# Patient Record
Sex: FEMALE | Race: WHITE | HISPANIC OR LATINO | Employment: OTHER | URBAN - METROPOLITAN AREA
[De-identification: names, ages, dates, MRNs, and addresses within clinical notes are randomized per-mention and may not be internally consistent; named-entity substitution may affect disease eponyms.]

---

## 2018-07-23 ENCOUNTER — HOME HEALTH ADMISSION (OUTPATIENT)
Dept: HOME HEALTH SERVICES | Facility: HOME HEALTHCARE | Age: 78
End: 2018-07-23
Payer: MEDICARE

## 2018-08-09 ENCOUNTER — APPOINTMENT (OUTPATIENT)
Dept: ADMISSIONS | Facility: MEDICAL CENTER | Age: 78
End: 2018-08-09
Attending: OPHTHALMOLOGY
Payer: MEDICARE

## 2018-08-09 DIAGNOSIS — Z01.810 PRE-OPERATIVE CARDIOVASCULAR EXAMINATION: ICD-10-CM

## 2018-08-09 RX ORDER — ALENDRONATE SODIUM 70 MG/1
70 TABLET ORAL
Status: ON HOLD | COMMUNITY
End: 2023-02-27

## 2018-08-09 RX ORDER — LOSARTAN POTASSIUM AND HYDROCHLOROTHIAZIDE 12.5; 5 MG/1; MG/1
1 TABLET ORAL DAILY
Status: ON HOLD | COMMUNITY
End: 2023-02-27

## 2018-08-10 LAB — EKG IMPRESSION: NORMAL

## 2018-08-13 ENCOUNTER — HOSPITAL ENCOUNTER (OUTPATIENT)
Facility: MEDICAL CENTER | Age: 78
End: 2018-08-13
Attending: OPHTHALMOLOGY | Admitting: OPHTHALMOLOGY

## 2018-08-13 VITALS
HEIGHT: 59 IN | RESPIRATION RATE: 16 BRPM | HEART RATE: 63 BPM | TEMPERATURE: 98.2 F | SYSTOLIC BLOOD PRESSURE: 137 MMHG | DIASTOLIC BLOOD PRESSURE: 82 MMHG | OXYGEN SATURATION: 98 % | BODY MASS INDEX: 30.4 KG/M2 | WEIGHT: 150.79 LBS

## 2018-08-13 PROCEDURE — 502240 HCHG MISC OR SUPPLY RC 0272: Performed by: OPHTHALMOLOGY

## 2018-08-13 PROCEDURE — 500855 HCHG NEEDLE, IRRIG CYSTOTOME 27G: Performed by: OPHTHALMOLOGY

## 2018-08-13 PROCEDURE — 99152 MOD SED SAME PHYS/QHP 5/>YRS: CPT | Performed by: OPHTHALMOLOGY

## 2018-08-13 PROCEDURE — 501836 HCHG SUTURE EYE: Performed by: OPHTHALMOLOGY

## 2018-08-13 PROCEDURE — 160048 HCHG OR STATISTICAL LEVEL 1-5: Performed by: OPHTHALMOLOGY

## 2018-08-13 PROCEDURE — 500792 HCHG KNIFE, SLIT 2.75 DUAL BEVEL ANGL: Performed by: OPHTHALMOLOGY

## 2018-08-13 PROCEDURE — 160029 HCHG SURGERY MINUTES - 1ST 30 MINS LEVEL 4: Performed by: OPHTHALMOLOGY

## 2018-08-13 PROCEDURE — 700101 HCHG RX REV CODE 250

## 2018-08-13 PROCEDURE — 160002 HCHG RECOVERY MINUTES (STAT): Performed by: OPHTHALMOLOGY

## 2018-08-13 PROCEDURE — 99153 MOD SED SAME PHYS/QHP EA: CPT | Performed by: OPHTHALMOLOGY

## 2018-08-13 PROCEDURE — 160035 HCHG PACU - 1ST 60 MINS PHASE I: Performed by: OPHTHALMOLOGY

## 2018-08-13 PROCEDURE — 700111 HCHG RX REV CODE 636 W/ 250 OVERRIDE (IP)

## 2018-08-13 PROCEDURE — 501749 HCHG SHELL REV 276: Performed by: OPHTHALMOLOGY

## 2018-08-13 PROCEDURE — 160041 HCHG SURGERY MINUTES - EA ADDL 1 MIN LEVEL 4: Performed by: OPHTHALMOLOGY

## 2018-08-13 DEVICE — IMPLANTABLE DEVICE: Type: IMPLANTABLE DEVICE | Site: EYE | Status: FUNCTIONAL

## 2018-08-13 RX ORDER — PROPARACAINE HYDROCHLORIDE 5 MG/ML
SOLUTION/ DROPS OPHTHALMIC
Status: COMPLETED
Start: 2018-08-13 | End: 2018-08-13

## 2018-08-13 RX ORDER — MOXIFLOXACIN 5 MG/ML
SOLUTION/ DROPS OPHTHALMIC
Status: DISCONTINUED | OUTPATIENT
Start: 2018-08-13 | End: 2018-08-13 | Stop reason: HOSPADM

## 2018-08-13 RX ORDER — SODIUM CHLORIDE, SODIUM LACTATE, POTASSIUM CHLORIDE, CALCIUM CHLORIDE 600; 310; 30; 20 MG/100ML; MG/100ML; MG/100ML; MG/100ML
INJECTION, SOLUTION INTRAVENOUS CONTINUOUS
Status: DISCONTINUED | OUTPATIENT
Start: 2018-08-13 | End: 2018-08-13 | Stop reason: HOSPADM

## 2018-08-13 RX ORDER — LIDOCAINE HYDROCHLORIDE 20 MG/ML
INJECTION, SOLUTION EPIDURAL; INFILTRATION; INTRACAUDAL; PERINEURAL
Status: DISCONTINUED | OUTPATIENT
Start: 2018-08-13 | End: 2018-08-13 | Stop reason: HOSPADM

## 2018-08-13 RX ORDER — PROPARACAINE HYDROCHLORIDE 5 MG/ML
SOLUTION/ DROPS OPHTHALMIC
Status: DISCONTINUED | OUTPATIENT
Start: 2018-08-13 | End: 2018-08-13 | Stop reason: HOSPADM

## 2018-08-13 RX ORDER — MOXIFLOXACIN 5 MG/ML
SOLUTION/ DROPS OPHTHALMIC
Status: DISCONTINUED
Start: 2018-08-13 | End: 2018-08-13 | Stop reason: HOSPADM

## 2018-08-13 RX ORDER — TROPICAMIDE 10 MG/ML
SOLUTION/ DROPS OPHTHALMIC
Status: COMPLETED
Start: 2018-08-13 | End: 2018-08-13

## 2018-08-13 RX ORDER — PHENYLEPHRINE HYDROCHLORIDE 25 MG/ML
SOLUTION/ DROPS OPHTHALMIC
Status: COMPLETED
Start: 2018-08-13 | End: 2018-08-13

## 2018-08-13 RX ADMIN — PROPARACAINE HYDROCHLORIDE 1 DROP: 5 SOLUTION/ DROPS OPHTHALMIC at 06:45

## 2018-08-13 RX ADMIN — SODIUM CHLORIDE, SODIUM LACTATE, POTASSIUM CHLORIDE, CALCIUM CHLORIDE 1000 ML: 600; 310; 30; 20 INJECTION, SOLUTION INTRAVENOUS at 07:00

## 2018-08-13 RX ADMIN — TROPICAMIDE 1 DROP: 10 SOLUTION/ DROPS OPHTHALMIC at 06:45

## 2018-08-13 RX ADMIN — PHENYLEPHRINE HYDROCHLORIDE 1 DROP: 2.5 SOLUTION/ DROPS OPHTHALMIC at 06:45

## 2018-08-13 ASSESSMENT — PAIN SCALES - GENERAL: PAINLEVEL_OUTOF10: 0

## 2018-08-13 NOTE — OP REPORT
DATE OF SERVICE:  08/13/2018    PROCEDURE:  Phaco IOL, left eye.    PREOPERATIVE DIAGNOSIS:  Cataract, left eye.    POSTOPERATIVE DIAGNOSIS:  Cataract, left eye.    SURGEON:  Edwardo Saeed MD    ASSISTANT:  None.    COMPLICATIONS:  None.    ESTIMATED BLOOD LOSS:  None.    ANESTHESIA:  Topical with MAC.    ANESTHESIOLOGIST:  Harley Campbell MD    Please note Trypan blue was used for enhanced necessary visualization of the   anterior capsule.    PROCEDURE IN DETAIL:  After appropriate consents were obtained, the patient   was brought to the operating room and then prepared and draped in the usual   sterile fashion for ophthalmology.  Lid speculum was placed in the left eye   after which a supersharp was used to make a stab incision at the 4 o'clock   position through which 2% preservative-free Xylocaine and Viscoat was injected   followed by a 2.75 mm stab incision at the 2 o'clock position through which   cystotomsalvador and Utrata made a 360 degree capsulorrhexis followed by   hydrodissection and spinning of the nucleus with BSS and a blunt cannula.   Phaco removed the lens.  I and A of the cortex. An SN60WF 19.5 was placed into   the posterior capsule with Provisc.  All the viscoelastics were removed with   I and A.  Lens was centered.  Lid speculum was removed under the microscope   after the wounds had been hydrated with BSS on a blunt cannula noted to be   free of leak and a lid speculum in the anterior chamber remained soft and well   formed.     LENS TYPE:  ZCB00 22.0.       ____________________________________     Edwardo Saeed MD    [facility] / [facility]    DD:  08/13/2018 11:37:06  DT:  08/13/2018 11:44:12    D#:  [id number]  Job#:  [id number]

## 2018-08-13 NOTE — DISCHARGE INSTRUCTIONS
HOME CARE INSTRUCTIONS FOR CATARACT SURGERY    ACTIVITY: Rest and take it easy for the first 24 hours. We strongly suggest that a responsible adult remain with you during that time. It is normal to feel sleepy. We encourage you to not do anything that requires balance, judgment or coordination. Be extra careful when walking (with a dilated eye, it is easier to trip and fall).     FOR 24 HOURS, DO NOT:       Drive, operate machinery or run household appliances.        Drink beer or alcoholic beverages.        Make important decisions or sign legal documents.     DIET: To avoid nausea, slowly advance diet as tolerated, avoiding spicy or greasy foods for the first meal.     MEDICATIONS: Resume taking daily medication. You may take Tylenol for mild discomfort, if needed.     SURGICAL DRESSING: Eye shield as instructed by your doctor. Dark glasses should be worn while in the sunlight.     Follow your Physician's instruction Sheet. Eye Kit Given    A follow-up appointment is scheduled with your doctor tomorrow at _______ am/pm.     You should call 911 if you develop problems with breathing or chest pain.  You should CALL YOUR PHYSICIAN if you develop: Sharp stabbing pain or sudden change in vision in your operative eye. If you are unable to contact your doctor or the surgical center, you should go to the nearest emergency room or urgent care center.   Physician's telephone # __Dr Saeed [phone]________________________    If any questions arise, call your doctor. If your doctor is not available, please feel free to call Same Day Surgery at [phone]. You can also call the [facility] Hotline open 24 hours/day, 7 days/week and speak to a nurse at [phone] or [phone].     I acknowledge receipt and understanding of these Home Care Instructions.    ______________________________     _______________________________            Signature of Patient / Responsible Adult                                                        RN Signature    A registered nurse may call you a few days after your surgery to see how you are doing.   You may also receive a survey in the mail within the next two weeks and we ask that you take a few moments to complete and return the survey. Our goal is to provide you with very good care and we value your comments. Thank you for choosing [facility].

## 2018-08-13 NOTE — OR NURSING
1029 Pt transferred to PACU. Report received from OR RN and anesthesia. Pt is awake and alert. VS stable, respirations even and unlabored. No bleeding, L eye is dilated. No complaints of pain.     1043 Pt and daughter educated on discharge instructions. Verbalized understanding. All questions answered. All belongings are with patient.

## 2019-02-20 ENCOUNTER — HOSPITAL ENCOUNTER (OUTPATIENT)
Dept: CARDIOLOGY | Facility: MEDICAL CENTER | Age: 79
End: 2019-02-20
Attending: SURGERY
Payer: MEDICARE

## 2019-02-20 LAB — EKG IMPRESSION: NORMAL

## 2019-02-20 PROCEDURE — 93010 ELECTROCARDIOGRAM REPORT: CPT | Performed by: INTERNAL MEDICINE

## 2019-02-20 PROCEDURE — 93005 ELECTROCARDIOGRAM TRACING: CPT | Performed by: SURGERY

## 2019-06-14 ENCOUNTER — OFFICE VISIT (OUTPATIENT)
Dept: CARDIOLOGY | Facility: MEDICAL CENTER | Age: 79
End: 2019-06-14
Payer: MEDICARE

## 2019-06-14 VITALS
HEART RATE: 76 BPM | SYSTOLIC BLOOD PRESSURE: 118 MMHG | DIASTOLIC BLOOD PRESSURE: 80 MMHG | OXYGEN SATURATION: 95 % | BODY MASS INDEX: 30.33 KG/M2 | HEIGHT: 59 IN | WEIGHT: 150.46 LBS

## 2019-06-14 DIAGNOSIS — Z01.810 PREOPERATIVE CARDIOVASCULAR EXAMINATION: ICD-10-CM

## 2019-06-14 DIAGNOSIS — I10 ESSENTIAL HYPERTENSION, BENIGN: ICD-10-CM

## 2019-06-14 PROBLEM — Q89.2 THYROGLOSSAL DUCT CYST: Status: ACTIVE | Noted: 2019-05-30

## 2019-06-14 PROBLEM — R41.3 MEMORY LOSS: Status: ACTIVE | Noted: 2019-05-30

## 2019-06-14 PROBLEM — N18.9 CHRONIC RENAL FAILURE: Status: ACTIVE | Noted: 2019-05-30

## 2019-06-14 PROBLEM — K76.89 LIVER CYST: Status: ACTIVE | Noted: 2019-05-30

## 2019-06-14 PROCEDURE — 99204 OFFICE O/P NEW MOD 45 MIN: CPT | Performed by: INTERNAL MEDICINE

## 2019-06-14 RX ORDER — LOSARTAN POTASSIUM 50 MG/1
TABLET ORAL
Refills: 1 | Status: ON HOLD | COMMUNITY
Start: 2019-05-16 | End: 2023-02-27

## 2019-06-14 NOTE — PROGRESS NOTES
Cardiology Initial Consultation Note    Date of note:    6/14/2019    Primary Care Provider: Pcp Pt States None  Referring Provider: Suzan Fam M.D.     Patient Name: Janine Lemon     YOB: 1940  MRN:              [id number]    Chief Complaint: pre-op cardiovascular exam    Janine Torrez is a 79 y.o. female  patient presented today for preop cardiovascular examination prior to thyroglossal cyst removal surgery.  She has dementia, her daughter helps her with food and medications but she takes care of herself otherwise.  She can walk 15 minutes at a slow pace.  Denies chest pain or shortness of breath with exertion.  No prior cardiovascular history.  She has hypertension which has been under control with medications.    ROS    Positive for weight loss, headache, hearing loss, memory loss  All other systems reviewed and discussed using a comprehensive questionnaire and are negative.     Past medical history, family history, social history, allergies and labs are reviewed and updated as needed as documented below.    Past Medical History:   Diagnosis Date   • Hypertension          Past Surgical History:   Procedure Laterality Date   • CATARACT PHACO WITH IOL Left 8/13/2018    Procedure: CATARACT PHACO WITH IOL;  Surgeon: Edwardo Saeed M.D.;  Location: SURGERY SAME DAY [facility];  Service: Ophthalmology         Current Outpatient Prescriptions   Medication Sig Dispense Refill   • losartan-hydrochlorothiazide (HYZAAR) 50-12.5 MG per tablet Take 1 Tab by mouth every day.     • losartan (COZAAR) 50 MG Tab TAKE 1 TABLET BY MOUTH DAILY  1   • alendronate (FOSAMAX) 70 MG Tab Take 70 mg by mouth every 7 days.       No current facility-administered medications for this visit.          No Known Allergies      No family history of coronary artery disease    Social History     Social History   • Marital status: Single     Spouse name: N/A   • Number of children: N/A   • Years of  "education: N/A     Occupational History   • Not on file.     Social History Main Topics   • Smoking status: Never Smoker   • Smokeless tobacco: Never Used   • Alcohol use No   • Drug use: No   • Sexual activity: Not on file     Other Topics Concern   • Not on file     Social History Narrative   • No narrative on file         Physical Exam:  Ambulatory Vitals  /80 (BP Location: Right arm, Patient Position: Sitting, BP Cuff Size: Adult)   Pulse 76   Ht 1.499 m (4' 11\")   Wt 68.2 kg (150 lb 7.4 oz)   SpO2 95%    Oxygen Therapy:  Pulse Oximetry: 95 %  BP Readings from Last 4 Encounters:   19 118/80   18 137/82   16 152/86       Weight/BMI: Body mass index is 30.39 kg/m².  Wt Readings from Last 4 Encounters:   19 68.2 kg (150 lb 7.4 oz)   18 68.4 kg (150 lb 12.7 oz)   16 73.9 kg (163 lb)       General: Well appearing and in no apparent distress  Head: atrumatic  Eyes: No conjunctival pallor   ENT: normal external appearance of nose and ears  Neck: JVD absent, carotid bruits absent  Lungs: respiratory sounds  normal, additional breath sounds absent  Heart: Regular rhythm,   No palpable thrills on palpation, murmurs absent, no rubs,   Lower extremity edema absent.   Pedal pulses normal  Abdomen: soft, non tender, non distended.  Extremities/MSK: no clubbing, no cyanosis  Neurological: normal orientation, Gait normal   Psychiatric: Appropriate affect, intact judgement and insight  Skin: Warm extremities      Cardiac Imaging and Procedures Review:    EKG dated 2019 : My personal interpretation is Sinus rhythm. Inferior Q waves.      Medical Decision Makin-year-old female patient with history of hypertension here for preop cardiovascular examination prior to thyroglossal cyst removal surgery.  -Her functional capacity is at least 4 METS.  No symptoms suggestive of coronary artery disease.  -Examination is benign, no murmurs, no evidence of heart failure.  -However her " EKG from February shows inferior Q waves with possible inferior MI, I will get an echocardiogram to evaluate her LV function, if normal she can proceed with surgery.  Blood pressure well controlled, continue current management with losartan and hydrochlorothiazide.    Follow-up as needed  This note was dictated using Dragon speech recognition software.    Guilherme ZARAGOZA  Interventional cardiologist  [facility]  [address] [address]  Phone: [phone]  Fax: [phone]

## 2021-01-14 DIAGNOSIS — Z23 NEED FOR VACCINATION: ICD-10-CM

## 2021-08-13 ENCOUNTER — HOSPITAL ENCOUNTER (OUTPATIENT)
Dept: LAB | Facility: MEDICAL CENTER | Age: 81
End: 2021-08-13
Attending: HOSPITALIST
Payer: MEDICARE

## 2021-08-13 LAB
ALBUMIN SERPL BCP-MCNC: 4.2 G/DL (ref 3.2–4.9)
ALBUMIN/GLOB SERPL: 1.5 G/DL
ALP SERPL-CCNC: 227 U/L (ref 30–99)
ALT SERPL-CCNC: 34 U/L (ref 2–50)
ANION GAP SERPL CALC-SCNC: 12 MMOL/L (ref 7–16)
AST SERPL-CCNC: 34 U/L (ref 12–45)
BILIRUB SERPL-MCNC: 0.7 MG/DL (ref 0.1–1.5)
BUN SERPL-MCNC: 23 MG/DL (ref 8–22)
CALCIUM SERPL-MCNC: 9.5 MG/DL (ref 8.5–10.5)
CHLORIDE SERPL-SCNC: 104 MMOL/L (ref 96–112)
CHOLEST SERPL-MCNC: 98 MG/DL (ref 100–199)
CK SERPL-CCNC: 62 U/L (ref 0–154)
CO2 SERPL-SCNC: 23 MMOL/L (ref 20–33)
CREAT SERPL-MCNC: 1.01 MG/DL (ref 0.5–1.4)
FASTING STATUS PATIENT QL REPORTED: NORMAL
GLOBULIN SER CALC-MCNC: 2.8 G/DL (ref 1.9–3.5)
GLUCOSE SERPL-MCNC: 92 MG/DL (ref 65–99)
HDLC SERPL-MCNC: 51 MG/DL
LDLC SERPL CALC-MCNC: 32 MG/DL
POTASSIUM SERPL-SCNC: 4.2 MMOL/L (ref 3.6–5.5)
PROT SERPL-MCNC: 7 G/DL (ref 6–8.2)
SODIUM SERPL-SCNC: 139 MMOL/L (ref 135–145)
TRIGL SERPL-MCNC: 77 MG/DL (ref 0–149)

## 2021-08-13 PROCEDURE — 82550 ASSAY OF CK (CPK): CPT

## 2021-08-13 PROCEDURE — 80061 LIPID PANEL: CPT

## 2021-08-13 PROCEDURE — 80053 COMPREHEN METABOLIC PANEL: CPT

## 2021-08-13 PROCEDURE — 36415 COLL VENOUS BLD VENIPUNCTURE: CPT

## 2021-11-08 ENCOUNTER — APPOINTMENT (OUTPATIENT)
Dept: RADIOLOGY | Facility: MEDICAL CENTER | Age: 81
End: 2021-11-08
Attending: EMERGENCY MEDICINE
Payer: MEDICARE

## 2021-11-08 ENCOUNTER — HOSPITAL ENCOUNTER (EMERGENCY)
Facility: MEDICAL CENTER | Age: 81
End: 2021-11-09
Attending: EMERGENCY MEDICINE
Payer: MEDICARE

## 2021-11-08 DIAGNOSIS — R42 DIZZINESS: ICD-10-CM

## 2021-11-08 LAB
BASOPHILS # BLD AUTO: 0.5 % (ref 0–1.8)
BASOPHILS # BLD: 0.03 K/UL (ref 0–0.12)
EKG IMPRESSION: NORMAL
EOSINOPHIL # BLD AUTO: 0.12 K/UL (ref 0–0.51)
EOSINOPHIL NFR BLD: 1.9 % (ref 0–6.9)
ERYTHROCYTE [DISTWIDTH] IN BLOOD BY AUTOMATED COUNT: 50.5 FL (ref 35.9–50)
HCT VFR BLD AUTO: 39 % (ref 37–47)
HGB BLD-MCNC: 12.4 G/DL (ref 12–16)
IMM GRANULOCYTES # BLD AUTO: 0.02 K/UL (ref 0–0.11)
IMM GRANULOCYTES NFR BLD AUTO: 0.3 % (ref 0–0.9)
LYMPHOCYTES # BLD AUTO: 1.34 K/UL (ref 1–4.8)
LYMPHOCYTES NFR BLD: 20.8 % (ref 22–41)
MCH RBC QN AUTO: 29.7 PG (ref 27–33)
MCHC RBC AUTO-ENTMCNC: 31.8 G/DL (ref 33.6–35)
MCV RBC AUTO: 93.5 FL (ref 81.4–97.8)
MONOCYTES # BLD AUTO: 0.37 K/UL (ref 0–0.85)
MONOCYTES NFR BLD AUTO: 5.8 % (ref 0–13.4)
NEUTROPHILS # BLD AUTO: 4.55 K/UL (ref 2–7.15)
NEUTROPHILS NFR BLD: 70.7 % (ref 44–72)
NRBC # BLD AUTO: 0 K/UL
NRBC BLD-RTO: 0 /100 WBC
PLATELET # BLD AUTO: 154 K/UL (ref 164–446)
PMV BLD AUTO: 9.1 FL (ref 9–12.9)
RBC # BLD AUTO: 4.17 M/UL (ref 4.2–5.4)
WBC # BLD AUTO: 6.4 K/UL (ref 4.8–10.8)

## 2021-11-08 PROCEDURE — 99284 EMERGENCY DEPT VISIT MOD MDM: CPT

## 2021-11-08 PROCEDURE — 80053 COMPREHEN METABOLIC PANEL: CPT

## 2021-11-08 PROCEDURE — 36415 COLL VENOUS BLD VENIPUNCTURE: CPT

## 2021-11-08 PROCEDURE — 93005 ELECTROCARDIOGRAM TRACING: CPT | Performed by: EMERGENCY MEDICINE

## 2021-11-08 PROCEDURE — 85025 COMPLETE CBC W/AUTO DIFF WBC: CPT

## 2021-11-09 VITALS
SYSTOLIC BLOOD PRESSURE: 160 MMHG | DIASTOLIC BLOOD PRESSURE: 96 MMHG | WEIGHT: 160 LBS | RESPIRATION RATE: 17 BRPM | HEIGHT: 64 IN | OXYGEN SATURATION: 91 % | TEMPERATURE: 97.3 F | HEART RATE: 84 BPM | BODY MASS INDEX: 27.31 KG/M2

## 2021-11-09 LAB
ALBUMIN SERPL BCP-MCNC: 4.2 G/DL (ref 3.2–4.9)
ALBUMIN/GLOB SERPL: 1.5 G/DL
ALP SERPL-CCNC: 118 U/L (ref 30–99)
ALT SERPL-CCNC: 15 U/L (ref 2–50)
ANION GAP SERPL CALC-SCNC: 10 MMOL/L (ref 7–16)
APPEARANCE UR: CLEAR
AST SERPL-CCNC: 35 U/L (ref 12–45)
BILIRUB SERPL-MCNC: 0.4 MG/DL (ref 0.1–1.5)
BILIRUB UR QL STRIP.AUTO: NEGATIVE
BUN SERPL-MCNC: 25 MG/DL (ref 8–22)
CALCIUM SERPL-MCNC: 9.5 MG/DL (ref 8.5–10.5)
CHLORIDE SERPL-SCNC: 106 MMOL/L (ref 96–112)
CO2 SERPL-SCNC: 22 MMOL/L (ref 20–33)
COLOR UR: YELLOW
CREAT SERPL-MCNC: 0.93 MG/DL (ref 0.5–1.4)
GLOBULIN SER CALC-MCNC: 2.8 G/DL (ref 1.9–3.5)
GLUCOSE SERPL-MCNC: 109 MG/DL (ref 65–99)
GLUCOSE UR STRIP.AUTO-MCNC: NEGATIVE MG/DL
KETONES UR STRIP.AUTO-MCNC: ABNORMAL MG/DL
LEUKOCYTE ESTERASE UR QL STRIP.AUTO: NEGATIVE
MICRO URNS: ABNORMAL
NITRITE UR QL STRIP.AUTO: NEGATIVE
PH UR STRIP.AUTO: 5.5 [PH] (ref 5–8)
POTASSIUM SERPL-SCNC: 5.1 MMOL/L (ref 3.6–5.5)
PROT SERPL-MCNC: 7 G/DL (ref 6–8.2)
PROT UR QL STRIP: NEGATIVE MG/DL
RBC UR QL AUTO: NEGATIVE
SODIUM SERPL-SCNC: 138 MMOL/L (ref 135–145)
SP GR UR STRIP.AUTO: 1.02
UROBILINOGEN UR STRIP.AUTO-MCNC: 0.2 MG/DL

## 2021-11-09 PROCEDURE — 81003 URINALYSIS AUTO W/O SCOPE: CPT

## 2021-11-09 PROCEDURE — 70450 CT HEAD/BRAIN W/O DYE: CPT

## 2021-11-09 NOTE — ED PROVIDER NOTES
ED Provider Note    CHIEF COMPLAINT  Chief Complaint   Patient presents with   • Dizziness     Pt c/o of a sudden onset on dizziness and weakness that began tonight. Pt denies any recent illness. Pt was recently started on Seroquel on 11/5 for behavioral issues due to her dementia.   • Weakness       HPI  Janine Fatima is a 81 y.o. female who presents with dizziness.  The patient took her Seroquel that she takes for sleep.  Subsequently the patient got up and went over to sit on the couch.  Subsequently she.  Pale and was dizzy.  Daughter states that she was diaphoretic and seemed to be slumped over.  She states she had some stuttering of her words.  Subsequently her symptoms have resolved.  She has not had any recent vomiting or diarrhea.  She was recently started on the Seroquel and 5 November.  She does have a diffuse headache.  She does not have any associated focal weakness.  She does not have any associated chest pain, palpitations, nor difficulty with breathing.  She does not describe vertigo.    REVIEW OF SYSTEMS  See HPI for further details. All other systems are negative.     PAST MEDICAL HISTORY  No past medical history on file.    FAMILY HISTORY  [unfilled]    SOCIAL HISTORY  Social History     Socioeconomic History   • Marital status: Not on file     Spouse name: Not on file   • Number of children: Not on file   • Years of education: Not on file   • Highest education level: Not on file   Occupational History   • Not on file   Tobacco Use   • Smoking status: Not on file   • Smokeless tobacco: Not on file   Substance and Sexual Activity   • Alcohol use: Not on file   • Drug use: Not on file   • Sexual activity: Not on file   Other Topics Concern   • Not on file   Social History Narrative   • Not on file     Social Determinants of Health     Financial Resource Strain:    • Difficulty of Paying Living Expenses: Not on file   Food Insecurity:    • Worried About Running Out of Food in the Last Year:  "Not on file   • Ran Out of Food in the Last Year: Not on file   Transportation Needs:    • Lack of Transportation (Medical): Not on file   • Lack of Transportation (Non-Medical): Not on file   Physical Activity:    • Days of Exercise per Week: Not on file   • Minutes of Exercise per Session: Not on file   Stress:    • Feeling of Stress : Not on file   Social Connections:    • Frequency of Communication with Friends and Family: Not on file   • Frequency of Social Gatherings with Friends and Family: Not on file   • Attends Spiritism Services: Not on file   • Active Member of Clubs or Organizations: Not on file   • Attends Club or Organization Meetings: Not on file   • Marital Status: Not on file   Intimate Partner Violence:    • Fear of Current or Ex-Partner: Not on file   • Emotionally Abused: Not on file   • Physically Abused: Not on file   • Sexually Abused: Not on file   Housing Stability:    • Unable to Pay for Housing in the Last Year: Not on file   • Number of Places Lived in the Last Year: Not on file   • Unstable Housing in the Last Year: Not on file       SURGICAL HISTORY  No past surgical history on file.    CURRENT MEDICATIONS  Home Medications     Reviewed by Julianna Rasmussen (Graduate Nurse) on 11/08/21 at [facility] [employer]  Med List Status: Not Addressed   Medication Last Dose Status        Patient Grzegorz Taking any Medications                       ALLERGIES  No Known Allergies    PHYSICAL EXAM  VITAL SIGNS: /82   Pulse 69   Temp (!) 35.6 °C (96.1 °F) (Temporal)   Resp 18   Ht 1.626 m (5' 4\")   Wt 72.6 kg (160 lb)   SpO2 94%   BMI 27.46 kg/m²       Constitutional: Chronically ill in appearance.   HENT: Normocephalic, Atraumatic, Bilateral external ears normal, Oropharynx moist, No oral exudates, Nose normal.   Eyes: PERRLA, EOMI, Conjunctiva normal, No discharge.   Neck: Normal range of motion, No tenderness, Supple, No stridor.   Lymphatic: No lymphadenopathy noted.   Cardiovascular: Normal heart rate, " Normal rhythm, No murmurs, No rubs, No gallops.   Thorax & Lungs: Normal breath sounds, No respiratory distress, No wheezing, No chest tenderness.   Abdomen: Bowel sounds normal, Soft, No tenderness, No masses, No pulsatile masses.   Skin: Warm, Dry, No erythema, No rash.   Back: No tenderness, No CVA tenderness.   Extremities: Intact distal pulses, No edema, No tenderness, No cyanosis, No clubbing.    Neurologic: Alert & oriented x 3, Normal motor function, Normal sensory function, No focal deficits noted.   Psychiatric: Affect normal, Judgment normal, Mood normal.     Results for orders placed or performed during the hospital encounter of 11/08/21   CBC WITH DIFFERENTIAL   Result Value Ref Range    WBC 6.4 4.8 - 10.8 K/uL    RBC 4.17 (L) 4.20 - 5.40 M/uL    Hemoglobin 12.4 12.0 - 16.0 g/dL    Hematocrit 39.0 37.0 - 47.0 %    MCV 93.5 81.4 - 97.8 fL    MCH 29.7 27.0 - 33.0 pg    MCHC 31.8 (L) 33.6 - 35.0 g/dL    RDW 50.5 (H) 35.9 - 50.0 fL    Platelet Count 154 (L) 164 - 446 K/uL    MPV 9.1 9.0 - 12.9 fL    Neutrophils-Polys 70.70 44.00 - 72.00 %    Lymphocytes 20.80 (L) 22.00 - 41.00 %    Monocytes 5.80 0.00 - 13.40 %    Eosinophils 1.90 0.00 - 6.90 %    Basophils 0.50 0.00 - 1.80 %    Immature Granulocytes 0.30 0.00 - 0.90 %    Nucleated RBC 0.00 /100 WBC    Neutrophils (Absolute) 4.55 2.00 - 7.15 K/uL    Lymphs (Absolute) 1.34 1.00 - 4.80 K/uL    Monos (Absolute) 0.37 0.00 - 0.85 K/uL    Eos (Absolute) 0.12 0.00 - 0.51 K/uL    Baso (Absolute) 0.03 0.00 - 0.12 K/uL    Immature Granulocytes (abs) 0.02 0.00 - 0.11 K/uL    NRBC (Absolute) 0.00 K/uL   COMP METABOLIC PANEL   Result Value Ref Range    Sodium 138 135 - 145 mmol/L    Potassium 5.1 3.6 - 5.5 mmol/L    Chloride 106 96 - 112 mmol/L    Co2 22 20 - 33 mmol/L    Anion Gap 10.0 7.0 - 16.0    Glucose 109 (H) 65 - 99 mg/dL    Bun 25 (H) 8 - 22 mg/dL    Creatinine 0.93 0.50 - 1.40 mg/dL    Calcium 9.5 8.5 - 10.5 mg/dL    AST(SGOT) 35 12 - 45 U/L    ALT(SGPT) 15 2  - 50 U/L    Alkaline Phosphatase 118 (H) 30 - 99 U/L    Total Bilirubin 0.4 0.1 - 1.5 mg/dL    Albumin 4.2 3.2 - 4.9 g/dL    Total Protein 7.0 6.0 - 8.2 g/dL    Globulin 2.8 1.9 - 3.5 g/dL    A-G Ratio 1.5 g/dL   URINALYSIS    Specimen: Urine, Clean Catch   Result Value Ref Range    Color Yellow     Character Clear     Specific Gravity 1.022 <1.035    Ph 5.5 5.0 - 8.0    Glucose Negative Negative mg/dL    Ketones Trace (A) Negative mg/dL    Protein Negative Negative mg/dL    Bilirubin Negative Negative    Urobilinogen, Urine 0.2 Negative    Nitrite Negative Negative    Leukocyte Esterase Negative Negative    Occult Blood Negative Negative    Micro Urine Req see below    ESTIMATED GFR   Result Value Ref Range    GFR If African American >60 >60 mL/min/1.73 m 2    GFR If Non African American 58 (A) >60 mL/min/1.73 m 2   EKG   Result Value Ref Range    Report       [facility] Emergency Dept.    Test Date:  2021  Pt Name:    JUN HERRERA               Department: ER  MRN:        [id number]                      Room:       [facility]  Gender:     Female                       Technician: [id number]  :        [id number]                   Requested By:BELEN LÓPEZ  Order #:    [id number]                    Reading MD: BELEN LÓPEZ MD    Measurements  Intervals                                Axis  Rate:       64                           P:          56  SD:         176                          QRS:        -27  QRSD:       88                           T:          19  QT:         428  QTc:        442    Interpretive Statements  Twelve-lead EKG shows a normal sinus rhythm with a ventricular to 64, QRS has  poor R wave progression, no ST segment elevation or depression, T wave  inverted  in lead III but overall no ischemic nor arrhythmic change  Electronically Signed On 2021 23:54:37 PST by BELEN LÓPEZ MD           RADIOLOGY/PROCEDURES  CT-HEAD W/O   Final Result         1.  No acute  intracranial abnormality is identified, there are nonspecific white matter changes, commonly associated with small vessel ischemic disease.  Associated mild cerebral atrophy is noted.   2.  Atherosclerosis.            COURSE & MEDICAL DECISION MAKING  Pertinent Labs & Imaging studies reviewed. (See chart for details)  This a 81-year-old female who presents after spell of dizziness. I suspect this is a combination of the Seroquel as well as some dehydration. She is neurologically intact at this time and CT scan does not show any evidence of intracranial source. Laboratory analysis does not show any significant metabolic derangements. There is a slight elevation of the BUN which would support dehydration and she also some ketones in her urine. Otherwise urinalysis does not show any evidence of urinary tract infection. The patient's been hemodynamically stable throughout her stay. She'll be discharged home with instructions to drink lots of fluids and follow-up with her primary care provider for further outpatient work-up. She will return if she is acutely worse.    FINAL IMPRESSION  1. Dizziness    Disposition  The patient will be discharged in stable condition         Electronically signed by: Lopez Herrera M.D., 11/8/2021 11:52 PM

## 2021-11-09 NOTE — ED TRIAGE NOTES
"Chief Complaint   Patient presents with   • Dizziness     Pt c/o of a sudden onset on dizziness and weakness that began tonight. Pt denies any recent illness. Pt was recently started on Seroquel on 11/5 for behavioral issues due to her dementia.   • Weakness     /82   Pulse 70   Temp (!) 35.6 °C (96.1 °F) (Temporal)   Resp 16   Ht 1.626 m (5' 4\")   Wt 72.6 kg (160 lb)   SpO2 89%   BMI 27.46 kg/m²   Pt placed on 2L nasal cannula with immediate improvement in saturations.   "

## 2023-01-28 ENCOUNTER — HOSPITAL ENCOUNTER (EMERGENCY)
Facility: MEDICAL CENTER | Age: 83
End: 2023-01-28
Attending: STUDENT IN AN ORGANIZED HEALTH CARE EDUCATION/TRAINING PROGRAM
Payer: MEDICARE

## 2023-01-28 ENCOUNTER — APPOINTMENT (OUTPATIENT)
Dept: RADIOLOGY | Facility: MEDICAL CENTER | Age: 83
End: 2023-01-28
Attending: STUDENT IN AN ORGANIZED HEALTH CARE EDUCATION/TRAINING PROGRAM
Payer: MEDICARE

## 2023-01-28 VITALS
RESPIRATION RATE: 18 BRPM | SYSTOLIC BLOOD PRESSURE: 167 MMHG | OXYGEN SATURATION: 97 % | HEART RATE: 69 BPM | WEIGHT: 156.53 LBS | BODY MASS INDEX: 31.56 KG/M2 | TEMPERATURE: 97.7 F | DIASTOLIC BLOOD PRESSURE: 89 MMHG | HEIGHT: 59 IN

## 2023-01-28 DIAGNOSIS — B37.9 YEAST INFECTION: ICD-10-CM

## 2023-01-28 DIAGNOSIS — K64.9 HEMORRHOIDS, UNSPECIFIED HEMORRHOID TYPE: ICD-10-CM

## 2023-01-28 LAB
ABO GROUP BLD: NORMAL
ALBUMIN SERPL BCP-MCNC: 4.1 G/DL (ref 3.2–4.9)
ALBUMIN/GLOB SERPL: 1.5 G/DL
ALP SERPL-CCNC: 130 U/L (ref 30–99)
ALT SERPL-CCNC: 8 U/L (ref 2–50)
ANION GAP SERPL CALC-SCNC: 10 MMOL/L (ref 7–16)
APTT PPP: 29.6 SEC (ref 24.7–36)
AST SERPL-CCNC: 16 U/L (ref 12–45)
BASOPHILS # BLD AUTO: 0.5 % (ref 0–1.8)
BASOPHILS # BLD: 0.03 K/UL (ref 0–0.12)
BILIRUB SERPL-MCNC: 0.3 MG/DL (ref 0.1–1.5)
BLD GP AB SCN SERPL QL: NORMAL
BUN SERPL-MCNC: 25 MG/DL (ref 8–22)
CALCIUM ALBUM COR SERPL-MCNC: 9.5 MG/DL (ref 8.5–10.5)
CALCIUM SERPL-MCNC: 9.6 MG/DL (ref 8.5–10.5)
CHLORIDE SERPL-SCNC: 107 MMOL/L (ref 96–112)
CO2 SERPL-SCNC: 23 MMOL/L (ref 20–33)
CREAT SERPL-MCNC: 1.08 MG/DL (ref 0.5–1.4)
EKG IMPRESSION: NORMAL
EOSINOPHIL # BLD AUTO: 0.06 K/UL (ref 0–0.51)
EOSINOPHIL NFR BLD: 1 % (ref 0–6.9)
ERYTHROCYTE [DISTWIDTH] IN BLOOD BY AUTOMATED COUNT: 45.1 FL (ref 35.9–50)
GFR SERPLBLD CREATININE-BSD FMLA CKD-EPI: 52 ML/MIN/1.73 M 2
GLOBULIN SER CALC-MCNC: 2.7 G/DL (ref 1.9–3.5)
GLUCOSE SERPL-MCNC: 112 MG/DL (ref 65–99)
HCT VFR BLD AUTO: 40.7 % (ref 37–47)
HGB BLD-MCNC: 13.3 G/DL (ref 12–16)
IMM GRANULOCYTES # BLD AUTO: 0.03 K/UL (ref 0–0.11)
IMM GRANULOCYTES NFR BLD AUTO: 0.5 % (ref 0–0.9)
INR PPP: 1.18 (ref 0.87–1.13)
LIPASE SERPL-CCNC: 52 U/L (ref 11–82)
LYMPHOCYTES # BLD AUTO: 1.59 K/UL (ref 1–4.8)
LYMPHOCYTES NFR BLD: 27.8 % (ref 22–41)
MCH RBC QN AUTO: 29.9 PG (ref 27–33)
MCHC RBC AUTO-ENTMCNC: 32.7 G/DL (ref 33.6–35)
MCV RBC AUTO: 91.5 FL (ref 81.4–97.8)
MONOCYTES # BLD AUTO: 0.34 K/UL (ref 0–0.85)
MONOCYTES NFR BLD AUTO: 5.9 % (ref 0–13.4)
NEUTROPHILS # BLD AUTO: 3.67 K/UL (ref 2–7.15)
NEUTROPHILS NFR BLD: 64.3 % (ref 44–72)
NRBC # BLD AUTO: 0 K/UL
NRBC BLD-RTO: 0 /100 WBC
PLATELET # BLD AUTO: 193 K/UL (ref 164–446)
PMV BLD AUTO: 8.8 FL (ref 9–12.9)
POTASSIUM SERPL-SCNC: 3.9 MMOL/L (ref 3.6–5.5)
PROT SERPL-MCNC: 6.8 G/DL (ref 6–8.2)
PROTHROMBIN TIME: 14.9 SEC (ref 12–14.6)
RBC # BLD AUTO: 4.45 M/UL (ref 4.2–5.4)
RH BLD: NORMAL
SODIUM SERPL-SCNC: 140 MMOL/L (ref 135–145)
TROPONIN T SERPL-MCNC: 11 NG/L (ref 6–19)
WBC # BLD AUTO: 5.7 K/UL (ref 4.8–10.8)

## 2023-01-28 PROCEDURE — 83690 ASSAY OF LIPASE: CPT

## 2023-01-28 PROCEDURE — 99284 EMERGENCY DEPT VISIT MOD MDM: CPT

## 2023-01-28 PROCEDURE — 84484 ASSAY OF TROPONIN QUANT: CPT

## 2023-01-28 PROCEDURE — 86850 RBC ANTIBODY SCREEN: CPT

## 2023-01-28 PROCEDURE — 80053 COMPREHEN METABOLIC PANEL: CPT

## 2023-01-28 PROCEDURE — 85610 PROTHROMBIN TIME: CPT

## 2023-01-28 PROCEDURE — 36415 COLL VENOUS BLD VENIPUNCTURE: CPT

## 2023-01-28 PROCEDURE — 86900 BLOOD TYPING SEROLOGIC ABO: CPT

## 2023-01-28 PROCEDURE — 86901 BLOOD TYPING SEROLOGIC RH(D): CPT

## 2023-01-28 PROCEDURE — 85730 THROMBOPLASTIN TIME PARTIAL: CPT

## 2023-01-28 PROCEDURE — 93005 ELECTROCARDIOGRAM TRACING: CPT | Performed by: STUDENT IN AN ORGANIZED HEALTH CARE EDUCATION/TRAINING PROGRAM

## 2023-01-28 PROCEDURE — 99285 EMERGENCY DEPT VISIT HI MDM: CPT

## 2023-01-28 PROCEDURE — 85025 COMPLETE CBC W/AUTO DIFF WBC: CPT

## 2023-01-28 PROCEDURE — 71045 X-RAY EXAM CHEST 1 VIEW: CPT

## 2023-01-28 RX ORDER — HYDROCORTISONE ACETATE 25 MG/1
25 SUPPOSITORY RECTAL EVERY 12 HOURS
Qty: 12 SUPPOSITORY | Refills: 0 | Status: SHIPPED | OUTPATIENT
Start: 2023-01-28

## 2023-01-28 RX ORDER — FLUCONAZOLE 150 MG/1
150 TABLET ORAL DAILY
Qty: 1 TABLET | Refills: 0 | Status: ON HOLD | OUTPATIENT
Start: 2023-01-28 | End: 2023-03-04

## 2023-01-28 RX ORDER — BENZOCAINE/MENTHOL 6 MG-10 MG
1 LOZENGE MUCOUS MEMBRANE 2 TIMES DAILY
Qty: 1.5 G | Refills: 0 | Status: SHIPPED | OUTPATIENT
Start: 2023-01-28

## 2023-01-28 ASSESSMENT — FIBROSIS 4 INDEX: FIB4 SCORE: 4.75

## 2023-01-28 NOTE — ED PROVIDER NOTES
ED Provider Note    CHIEF COMPLAINT  Blood noted in diapers    EXTERNAL RECORDS REVIEWED  Inpatient Notes prior ED visit    HPI/ROS  LIMITATION TO HISTORY   Select: Altered mental status / Confusion patient has a history of dementia is at baseline  OUTSIDE HISTORIAN(S):  Family granddaughters at bedside provide history as patient is demented though she is at her baseline    Janine Torrez is a 83 y.o. female who presents evaluation of intermittent episodes of brown stool mixed with blood noted in her diaper.  The granddaughters had to take care of the patient, she is demented at her baseline she is incontinent at her baseline intermittently over the past several weeks they have noted several episodes of blood in her diaper they are unsure whether this is coming from her vagina or rectum.  Occasionally they do note bright red blood on the outside of stools.  No prior history of this in the past.  They called her PCP to schedule an appointment were referred to the nurse hotline and recommended the patient come to the ER.  Patient has no complaints.    PAST MEDICAL HISTORY   has a past medical history of Hypertension.    SURGICAL HISTORY   has a past surgical history that includes cataract phaco with iol (Left, 8/13/2018).    FAMILY HISTORY  No family history on file.    SOCIAL HISTORY  Social History     Tobacco Use    Smoking status: Never    Smokeless tobacco: Never   Substance and Sexual Activity    Alcohol use: No    Drug use: No    Sexual activity: Not on file       CURRENT MEDICATIONS  Home Medications       Reviewed by Fadumo Morel R.N. (Registered Nurse) on 01/28/23 at 0112  Med List Status: Partial     Medication Last Dose Status   alendronate (FOSAMAX) 70 MG Tab  Active   losartan (COZAAR) 50 MG Tab  Active   losartan-hydrochlorothiazide (HYZAAR) 50-12.5 MG per tablet  Active                    ALLERGIES  No Known Allergies    PHYSICAL EXAM  VITAL SIGNS: BP (!) 167/89   Pulse 69   Temp 36.5 °C  "(97.7 °F)   Resp 18   Ht 1.499 m (4' 11\")   Wt 71 kg (156 lb 8.4 oz)   SpO2 97%   BMI 31.61 kg/m²      Pulse ox interpretation: I interpret this pulse ox as normal.  VITALS - vital signs documented prior to this note have been reviewed and noted,  GENERAL - awake, alert, oriented, GCS 15, no apparent distress, non-toxic  appearing  HEENT - normocephalic, atraumatic, pupils equal, sclera anicteric, mucus  membranes moist  NECK - supple, no meningismus, full active range of motion, trachea midline  CARDIOVASCULAR - regular rate/rhythm, no murmurs/gallops/rubs  PULMONARY - no respiratory distress, speaking in full sentences, clear to  auscultation bilaterally, no wheezing/ronchi/rales, no accessory muscle use  GASTROINTESTINAL - soft, non-tender, non-distended, no rebound, guarding,  or peritonitis  GENITOURINARY -candidal vaginitis vaginal bleeding rectal exam showed a very small nonthrombosed external hemorrhoid no hematochezia or melena  NEUROLOGIC - Awake alert, normal mental status, speech fluid, cognition  normal, moves all extremities  MUSCULOSKELETAL - no obvious asymmetry or deformities present  EXTREMITIES - warm, well-perfused, no cyanosis or significant edema  DERMATOLOGIC - warm, dry, no rashes, no jaundice  PSYCHIATRIC - normal affect, normal insight, normal concentration    DIAGNOSTIC STUDIES / PROCEDURES      LABS  Were obtained were nonactionable    RADIOLOGY  I have independently interpreted the diagnostic imaging associated with this visit and am waiting the final reading from the radiologist.   My preliminary interpretation is a follows: Negative for pneumonia  Radiologist interpretation: No acute cardiopulmonary process    COURSE & MEDICAL DECISION MAKING    ED Observation Status? No; Patient does not meet criteria for ED Observation.     INITIAL ASSESSMENT, COURSE AND PLAN  Care Narrative: Patient presented for evaluation of blood noted in the diapers.  Diaper today does not show any obvious " bleeding.  A rectal exam was performed showed no obvious melena or hematochezia very small nonthrombosed external hemorrhoid though I actually doubt this is etiology of the blood they have noted in the diaper.  Labs were obtained showed no evidence of anemia or significant coagulopathies.  No evidence of thrombocytopenia.  Pelvic exam was performed there is no active vaginal bleeding.  At this point time unclear what is been causing the intermittent episodes of blood noted in the diaper though she is otherwise hemodynamically stable with no signs of hemorrhage this evening thus I consider outpatient management really reasonable.  I recommend they follow-up with their PCP.  They were provided appropriate referrals.  Will try treating the patient for a possible hemorrhoid, as a cause of the blood noted in the diaper.  Also on her pelvic exam she did have a discharge which appeared consistent with a candidal infection will be treated with single dose fluconazole.  Otherwise patient is at her baseline mentation hemodynamically stable with reassuring labs I do believe she is appropriate for outpatient management.  They are discharged in a stable condition     HTN/IDDM FOLLOW UP:  The patient has known hypertension and is being followed by their primary care doctor      ADDITIONAL PROBLEM LIST    DISPOSITION AND DISCUSSIONS  I have discussed management of the patient with the following physicians and SHANA's:  none    Discussion of management with other QHP or appropriate source(s): None     Escalation of care considered, and ultimately not performed:diagnostic imaging    Barriers to care at this time, including but not limited to:  none .     Decision tools and prescription drugs considered including, but not limited to:  none .    FINAL DIAGNOSIS  1. Hemorrhoids, unspecified hemorrhoid type    2. Yeast infection           Electronically signed by: Mo Baker D.O., 1/28/2023 2:58 AM

## 2023-01-28 NOTE — DISCHARGE INSTRUCTIONS
She needs to follow-up with her primary care physician as well as gastroenterology, take the medication for yeast infection, and use the suppositories for hemorrhoids if she develops any black tarry stools frankly bloody stools or any other concerning symptoms return to the ER.  If you notice any vaginal bleeding she needs to follow-up with her PCP or gynecology.

## 2023-01-28 NOTE — ED TRIAGE NOTES
"Chief Complaint   Patient presents with    Bleeding/Bruising     Family and patient are unsure if bleeding is coming from vaginal or rectal area.  Pt has hx of alzheimer's per family, pt never reported the bleeding to family members, unsure on when it started.      Chest Pain     \"Discomfort\" per patient, occurred earlier today, denies it happening now.       Pt ambulated to triage. Pt A&Ox3, disoriented to year, does know month.  Per family this is normal.  Came in for above complaint.     Pt to lobby . Pt educated on alerting staff in changes to condition. Pt verbalized understanding. Protocol ordered.     BP (!) 171/96   Pulse 69   Temp 36.4 °C (97.5 °F) (Temporal)   Resp 12   Ht 1.499 m (4' 11\")   Wt 71 kg (156 lb 8.4 oz)   SpO2 94%   BMI 31.61 kg/m²     "

## 2023-02-27 ENCOUNTER — APPOINTMENT (OUTPATIENT)
Dept: RADIOLOGY | Facility: MEDICAL CENTER | Age: 83
DRG: 065 | End: 2023-02-27
Attending: EMERGENCY MEDICINE
Payer: MEDICARE

## 2023-02-27 ENCOUNTER — APPOINTMENT (OUTPATIENT)
Dept: RADIOLOGY | Facility: MEDICAL CENTER | Age: 83
DRG: 065 | End: 2023-02-27
Attending: NURSE PRACTITIONER
Payer: MEDICARE

## 2023-02-27 ENCOUNTER — HOSPITAL ENCOUNTER (INPATIENT)
Facility: MEDICAL CENTER | Age: 83
LOS: 5 days | DRG: 065 | End: 2023-03-04
Attending: EMERGENCY MEDICINE | Admitting: STUDENT IN AN ORGANIZED HEALTH CARE EDUCATION/TRAINING PROGRAM
Payer: MEDICARE

## 2023-02-27 DIAGNOSIS — I61.9 HEMORRHAGIC STROKE (HCC): ICD-10-CM

## 2023-02-27 DIAGNOSIS — G30.9 ALZHEIMER'S DEMENTIA WITH BEHAVIORAL DISTURBANCE (HCC): ICD-10-CM

## 2023-02-27 DIAGNOSIS — F02.818 ALZHEIMER'S DEMENTIA WITH BEHAVIORAL DISTURBANCE (HCC): ICD-10-CM

## 2023-02-27 PROBLEM — E78.49 OTHER HYPERLIPIDEMIA: Status: ACTIVE | Noted: 2023-02-27

## 2023-02-27 LAB
ABO GROUP BLD: NORMAL
ALBUMIN SERPL BCP-MCNC: 3.8 G/DL (ref 3.2–4.9)
ALBUMIN/GLOB SERPL: 1.5 G/DL
ALP SERPL-CCNC: 119 U/L (ref 30–99)
ALT SERPL-CCNC: 11 U/L (ref 2–50)
ANION GAP SERPL CALC-SCNC: 10 MMOL/L (ref 7–16)
APTT PPP: 38.1 SEC (ref 24.7–36)
AST SERPL-CCNC: 21 U/L (ref 12–45)
BASOPHILS # BLD AUTO: 0 % (ref 0–1.8)
BASOPHILS # BLD: 0 K/UL (ref 0–0.12)
BILIRUB SERPL-MCNC: 0.4 MG/DL (ref 0.1–1.5)
BLD GP AB SCN SERPL QL: NORMAL
BUN SERPL-MCNC: 21 MG/DL (ref 8–22)
CALCIUM ALBUM COR SERPL-MCNC: 8.3 MG/DL (ref 8.5–10.5)
CALCIUM SERPL-MCNC: 8.1 MG/DL (ref 8.5–10.5)
CFT BLD TEG: 4.1 MIN (ref 4.6–9.1)
CFT P HPASE BLD TEG: 3.8 MIN (ref 4.3–8.3)
CHLORIDE SERPL-SCNC: 108 MMOL/L (ref 96–112)
CHOLEST SERPL-MCNC: 104 MG/DL (ref 100–199)
CLOT ANGLE BLD TEG: 72.8 DEGREES (ref 63–78)
CLOT LYSIS 30M P MA LENFR BLD TEG: 0.1 % (ref 0–2.6)
CO2 SERPL-SCNC: 19 MMOL/L (ref 20–33)
CREAT SERPL-MCNC: 0.77 MG/DL (ref 0.5–1.4)
CT.EXTRINSIC BLD ROTEM: 1.5 MIN (ref 0.8–2.1)
EKG IMPRESSION: NORMAL
EOSINOPHIL # BLD AUTO: 0.11 K/UL (ref 0–0.51)
EOSINOPHIL NFR BLD: 1.7 % (ref 0–6.9)
ERYTHROCYTE [DISTWIDTH] IN BLOOD BY AUTOMATED COUNT: 45.2 FL (ref 35.9–50)
FLUAV RNA SPEC QL NAA+PROBE: NEGATIVE
FLUBV RNA SPEC QL NAA+PROBE: NEGATIVE
GFR SERPLBLD CREATININE-BSD FMLA CKD-EPI: 77 ML/MIN/1.73 M 2
GLOBULIN SER CALC-MCNC: 2.6 G/DL (ref 1.9–3.5)
GLUCOSE BLD STRIP.AUTO-MCNC: 100 MG/DL (ref 65–99)
GLUCOSE BLD STRIP.AUTO-MCNC: 100 MG/DL (ref 65–99)
GLUCOSE BLD STRIP.AUTO-MCNC: 107 MG/DL (ref 65–99)
GLUCOSE SERPL-MCNC: 86 MG/DL (ref 65–99)
HCT VFR BLD AUTO: 40.7 % (ref 37–47)
HDLC SERPL-MCNC: 43 MG/DL
HGB BLD-MCNC: 13.4 G/DL (ref 12–16)
INR PPP: 1.72 (ref 0.87–1.13)
LDLC SERPL CALC-MCNC: 41 MG/DL
LYMPHOCYTES # BLD AUTO: 1.49 K/UL (ref 1–4.8)
LYMPHOCYTES NFR BLD: 22.6 % (ref 22–41)
MAGNESIUM SERPL-MCNC: 1.6 MG/DL (ref 1.5–2.5)
MAGNESIUM SERPL-MCNC: 1.8 MG/DL (ref 1.5–2.5)
MANUAL DIFF BLD: NORMAL
MCF BLD TEG: 54.1 MM (ref 52–69)
MCF.PLATELET INHIB BLD ROTEM: 17.6 MM (ref 15–32)
MCH RBC QN AUTO: 29.6 PG (ref 27–33)
MCHC RBC AUTO-ENTMCNC: 32.9 G/DL (ref 33.6–35)
MCV RBC AUTO: 89.8 FL (ref 81.4–97.8)
MONOCYTES # BLD AUTO: 0.23 K/UL (ref 0–0.85)
MONOCYTES NFR BLD AUTO: 3.5 % (ref 0–13.4)
MORPHOLOGY BLD-IMP: NORMAL
NEUTROPHILS # BLD AUTO: 4.77 K/UL (ref 2–7.15)
NEUTROPHILS NFR BLD: 72.2 % (ref 44–72)
NRBC # BLD AUTO: 0 K/UL
NRBC BLD-RTO: 0 /100 WBC
OVALOCYTES BLD QL SMEAR: NORMAL
PA AA BLD-ACNC: 7.4 % (ref 0–11)
PA ADP BLD-ACNC: 22.9 % (ref 0–17)
PHOSPHATE SERPL-MCNC: 2.2 MG/DL (ref 2.5–4.5)
PHOSPHATE SERPL-MCNC: 2.3 MG/DL (ref 2.5–4.5)
PLATELET # BLD AUTO: 199 K/UL (ref 164–446)
PLATELET BLD QL SMEAR: NORMAL
PMV BLD AUTO: 9.3 FL (ref 9–12.9)
POIKILOCYTOSIS BLD QL SMEAR: NORMAL
POTASSIUM SERPL-SCNC: 3.3 MMOL/L (ref 3.6–5.5)
PROT SERPL-MCNC: 6.4 G/DL (ref 6–8.2)
PROTHROMBIN TIME: 19.8 SEC (ref 12–14.6)
RBC # BLD AUTO: 4.53 M/UL (ref 4.2–5.4)
RBC BLD AUTO: PRESENT
RH BLD: NORMAL
RSV RNA SPEC QL NAA+PROBE: NEGATIVE
SARS-COV-2 RNA RESP QL NAA+PROBE: NOTDETECTED
SODIUM SERPL-SCNC: 137 MMOL/L (ref 135–145)
SPECIMEN SOURCE: NORMAL
TEG ALGORITHM TGALG: ABNORMAL
TRIGL SERPL-MCNC: 100 MG/DL (ref 0–149)
TROPONIN T SERPL-MCNC: 9 NG/L (ref 6–19)
WBC # BLD AUTO: 6.6 K/UL (ref 4.8–10.8)

## 2023-02-27 PROCEDURE — 80061 LIPID PANEL: CPT

## 2023-02-27 PROCEDURE — 80053 COMPREHEN METABOLIC PANEL: CPT

## 2023-02-27 PROCEDURE — 85610 PROTHROMBIN TIME: CPT

## 2023-02-27 PROCEDURE — 86850 RBC ANTIBODY SCREEN: CPT

## 2023-02-27 PROCEDURE — 700111 HCHG RX REV CODE 636 W/ 250 OVERRIDE (IP): Performed by: INTERNAL MEDICINE

## 2023-02-27 PROCEDURE — 86900 BLOOD TYPING SEROLOGIC ABO: CPT

## 2023-02-27 PROCEDURE — 96366 THER/PROPH/DIAG IV INF ADDON: CPT

## 2023-02-27 PROCEDURE — 85025 COMPLETE CBC W/AUTO DIFF WBC: CPT

## 2023-02-27 PROCEDURE — 700111 HCHG RX REV CODE 636 W/ 250 OVERRIDE (IP): Mod: JG | Performed by: EMERGENCY MEDICINE

## 2023-02-27 PROCEDURE — 30283B1 TRANSFUSION OF NONAUTOLOGOUS 4-FACTOR PROTHROMBIN COMPLEX CONCENTRATE INTO VEIN, PERCUTANEOUS APPROACH: ICD-10-PCS | Performed by: STUDENT IN AN ORGANIZED HEALTH CARE EDUCATION/TRAINING PROGRAM

## 2023-02-27 PROCEDURE — 99223 1ST HOSP IP/OBS HIGH 75: CPT | Mod: 25,FS | Performed by: NURSE PRACTITIONER

## 2023-02-27 PROCEDURE — 700102 HCHG RX REV CODE 250 W/ 637 OVERRIDE(OP): Performed by: NURSE PRACTITIONER

## 2023-02-27 PROCEDURE — 85576 BLOOD PLATELET AGGREGATION: CPT

## 2023-02-27 PROCEDURE — 70450 CT HEAD/BRAIN W/O DYE: CPT

## 2023-02-27 PROCEDURE — 700101 HCHG RX REV CODE 250: Performed by: EMERGENCY MEDICINE

## 2023-02-27 PROCEDURE — 70496 CT ANGIOGRAPHY HEAD: CPT

## 2023-02-27 PROCEDURE — 84484 ASSAY OF TROPONIN QUANT: CPT

## 2023-02-27 PROCEDURE — 99291 CRITICAL CARE FIRST HOUR: CPT | Performed by: PSYCHIATRY & NEUROLOGY

## 2023-02-27 PROCEDURE — 86901 BLOOD TYPING SEROLOGIC RH(D): CPT

## 2023-02-27 PROCEDURE — 0241U HCHG SARS-COV-2 COVID-19 NFCT DS RESP RNA 4 TRGT MIC: CPT

## 2023-02-27 PROCEDURE — 700105 HCHG RX REV CODE 258: Performed by: INTERNAL MEDICINE

## 2023-02-27 PROCEDURE — 96365 THER/PROPH/DIAG IV INF INIT: CPT

## 2023-02-27 PROCEDURE — 82962 GLUCOSE BLOOD TEST: CPT | Mod: 91

## 2023-02-27 PROCEDURE — 99291 CRITICAL CARE FIRST HOUR: CPT | Performed by: INTERNAL MEDICINE

## 2023-02-27 PROCEDURE — 700105 HCHG RX REV CODE 258: Performed by: EMERGENCY MEDICINE

## 2023-02-27 PROCEDURE — 94760 N-INVAS EAR/PLS OXIMETRY 1: CPT

## 2023-02-27 PROCEDURE — 37195 THROMBOLYTIC THERAPY STROKE: CPT

## 2023-02-27 PROCEDURE — 85347 COAGULATION TIME ACTIVATED: CPT

## 2023-02-27 PROCEDURE — 83735 ASSAY OF MAGNESIUM: CPT

## 2023-02-27 PROCEDURE — 93005 ELECTROCARDIOGRAM TRACING: CPT | Performed by: EMERGENCY MEDICINE

## 2023-02-27 PROCEDURE — 99223 1ST HOSP IP/OBS HIGH 75: CPT | Performed by: PHYSICAL MEDICINE & REHABILITATION

## 2023-02-27 PROCEDURE — 700111 HCHG RX REV CODE 636 W/ 250 OVERRIDE (IP): Performed by: NURSE PRACTITIONER

## 2023-02-27 PROCEDURE — 99291 CRITICAL CARE FIRST HOUR: CPT

## 2023-02-27 PROCEDURE — 84100 ASSAY OF PHOSPHORUS: CPT

## 2023-02-27 PROCEDURE — 71045 X-RAY EXAM CHEST 1 VIEW: CPT

## 2023-02-27 PROCEDURE — 700117 HCHG RX CONTRAST REV CODE 255: Performed by: EMERGENCY MEDICINE

## 2023-02-27 PROCEDURE — 700101 HCHG RX REV CODE 250: Performed by: INTERNAL MEDICINE

## 2023-02-27 PROCEDURE — 85384 FIBRINOGEN ACTIVITY: CPT

## 2023-02-27 PROCEDURE — 85007 BL SMEAR W/DIFF WBC COUNT: CPT

## 2023-02-27 PROCEDURE — 85730 THROMBOPLASTIN TIME PARTIAL: CPT

## 2023-02-27 PROCEDURE — 70498 CT ANGIOGRAPHY NECK: CPT

## 2023-02-27 PROCEDURE — C9803 HOPD COVID-19 SPEC COLLECT: HCPCS | Performed by: INTERNAL MEDICINE

## 2023-02-27 PROCEDURE — 770022 HCHG ROOM/CARE - ICU (200)

## 2023-02-27 PROCEDURE — 36415 COLL VENOUS BLD VENIPUNCTURE: CPT

## 2023-02-27 RX ORDER — LOSARTAN POTASSIUM 50 MG/1
50 TABLET ORAL DAILY
Status: ON HOLD | COMMUNITY
End: 2023-03-04

## 2023-02-27 RX ORDER — ACETAMINOPHEN 650 MG/1
650 SUPPOSITORY RECTAL EVERY 4 HOURS PRN
Status: DISCONTINUED | OUTPATIENT
Start: 2023-02-27 | End: 2023-03-04 | Stop reason: HOSPADM

## 2023-02-27 RX ORDER — POTASSIUM CHLORIDE 7.45 MG/ML
10 INJECTION INTRAVENOUS
Status: COMPLETED | OUTPATIENT
Start: 2023-02-27 | End: 2023-02-27

## 2023-02-27 RX ORDER — HYDRALAZINE HYDROCHLORIDE 20 MG/ML
10 INJECTION INTRAMUSCULAR; INTRAVENOUS EVERY 4 HOURS PRN
Status: DISCONTINUED | OUTPATIENT
Start: 2023-02-27 | End: 2023-03-04 | Stop reason: HOSPADM

## 2023-02-27 RX ORDER — LABETALOL HYDROCHLORIDE 5 MG/ML
10 INJECTION, SOLUTION INTRAVENOUS EVERY 4 HOURS PRN
Status: DISCONTINUED | OUTPATIENT
Start: 2023-02-27 | End: 2023-03-04 | Stop reason: HOSPADM

## 2023-02-27 RX ORDER — BISACODYL 10 MG
10 SUPPOSITORY, RECTAL RECTAL
Status: DISCONTINUED | OUTPATIENT
Start: 2023-02-27 | End: 2023-03-04 | Stop reason: HOSPADM

## 2023-02-27 RX ORDER — POLYETHYLENE GLYCOL 3350 17 G/17G
1 POWDER, FOR SOLUTION ORAL
Status: DISCONTINUED | OUTPATIENT
Start: 2023-02-27 | End: 2023-03-04 | Stop reason: HOSPADM

## 2023-02-27 RX ORDER — NOREPINEPHRINE BITARTRATE 0.03 MG/ML
0-1 INJECTION, SOLUTION INTRAVENOUS CONTINUOUS
Status: DISCONTINUED | OUTPATIENT
Start: 2023-02-27 | End: 2023-02-28

## 2023-02-27 RX ORDER — NOREPINEPHRINE BITARTRATE 0.03 MG/ML
0-1 INJECTION, SOLUTION INTRAVENOUS CONTINUOUS
Status: DISCONTINUED | OUTPATIENT
Start: 2023-02-27 | End: 2023-02-27

## 2023-02-27 RX ORDER — ONDANSETRON 2 MG/ML
4 INJECTION INTRAMUSCULAR; INTRAVENOUS EVERY 4 HOURS PRN
Status: DISCONTINUED | OUTPATIENT
Start: 2023-02-27 | End: 2023-03-04 | Stop reason: HOSPADM

## 2023-02-27 RX ORDER — LABETALOL HYDROCHLORIDE 5 MG/ML
10 INJECTION, SOLUTION INTRAVENOUS ONCE
Status: DISCONTINUED | OUTPATIENT
Start: 2023-02-27 | End: 2023-02-27

## 2023-02-27 RX ORDER — QUETIAPINE FUMARATE 25 MG/1
25 TABLET, FILM COATED ORAL DAILY
COMMUNITY

## 2023-02-27 RX ORDER — ONDANSETRON 4 MG/1
4 TABLET, ORALLY DISINTEGRATING ORAL EVERY 4 HOURS PRN
Status: DISCONTINUED | OUTPATIENT
Start: 2023-02-27 | End: 2023-03-04 | Stop reason: HOSPADM

## 2023-02-27 RX ORDER — LORAZEPAM 2 MG/ML
2 INJECTION INTRAMUSCULAR
Status: DISCONTINUED | OUTPATIENT
Start: 2023-02-27 | End: 2023-03-04 | Stop reason: HOSPADM

## 2023-02-27 RX ORDER — ACETAMINOPHEN 325 MG/1
650 TABLET ORAL EVERY 4 HOURS PRN
Status: DISCONTINUED | OUTPATIENT
Start: 2023-02-27 | End: 2023-02-27

## 2023-02-27 RX ORDER — MAGNESIUM SULFATE HEPTAHYDRATE 40 MG/ML
2 INJECTION, SOLUTION INTRAVENOUS ONCE
Status: COMPLETED | OUTPATIENT
Start: 2023-02-27 | End: 2023-02-27

## 2023-02-27 RX ORDER — AMOXICILLIN 250 MG
2 CAPSULE ORAL 2 TIMES DAILY
Status: DISCONTINUED | OUTPATIENT
Start: 2023-02-27 | End: 2023-03-04 | Stop reason: HOSPADM

## 2023-02-27 RX ORDER — ACETAMINOPHEN 325 MG/1
650 TABLET ORAL EVERY 4 HOURS PRN
Status: DISCONTINUED | OUTPATIENT
Start: 2023-02-27 | End: 2023-03-04 | Stop reason: HOSPADM

## 2023-02-27 RX ORDER — ACETAMINOPHEN 650 MG/1
650 SUPPOSITORY RECTAL EVERY 4 HOURS PRN
Status: DISCONTINUED | OUTPATIENT
Start: 2023-02-27 | End: 2023-02-27

## 2023-02-27 RX ORDER — AMLODIPINE BESYLATE 2.5 MG/1
2.5 TABLET ORAL DAILY
Status: ON HOLD | COMMUNITY
End: 2023-03-04

## 2023-02-27 RX ADMIN — POTASSIUM CHLORIDE 10 MEQ: 7.46 INJECTION, SOLUTION INTRAVENOUS at 06:06

## 2023-02-27 RX ADMIN — NICARDIPINE HYDROCHLORIDE 5 MG/HR: 25 INJECTION, SOLUTION INTRAVENOUS at 22:07

## 2023-02-27 RX ADMIN — POTASSIUM CHLORIDE 10 MEQ: 7.46 INJECTION, SOLUTION INTRAVENOUS at 04:57

## 2023-02-27 RX ADMIN — IOHEXOL 80 ML: 350 INJECTION, SOLUTION INTRAVENOUS at 01:48

## 2023-02-27 RX ADMIN — POTASSIUM PHOSPHATE, MONOBASIC AND POTASSIUM PHOSPHATE, DIBASIC 15 MMOL: 224; 236 INJECTION, SOLUTION, CONCENTRATE INTRAVENOUS at 13:04

## 2023-02-27 RX ADMIN — PROTHROMBIN, COAGULATION FACTOR VII HUMAN, COAGULATION FACTOR IX HUMAN, COAGULATION FACTOR X HUMAN, PROTEIN C, PROTEIN S HUMAN, AND WATER 3500 UNITS: KIT at 02:41

## 2023-02-27 RX ADMIN — NICARDIPINE HYDROCHLORIDE 2.5 MG/HR: 25 INJECTION, SOLUTION INTRAVENOUS at 14:19

## 2023-02-27 RX ADMIN — MAGNESIUM SULFATE HEPTAHYDRATE 2 G: 40 INJECTION, SOLUTION INTRAVENOUS at 16:31

## 2023-02-27 RX ADMIN — NICARDIPINE HYDROCHLORIDE 5 MG/HR: 25 INJECTION, SOLUTION INTRAVENOUS at 06:55

## 2023-02-27 RX ADMIN — NICARDIPINE HYDROCHLORIDE 5 MG/HR: 25 INJECTION, SOLUTION INTRAVENOUS at 02:30

## 2023-02-27 ASSESSMENT — ENCOUNTER SYMPTOMS
MUSCULOSKELETAL NEGATIVE: 1
CARDIOVASCULAR NEGATIVE: 1
DIZZINESS: 1
CONSTITUTIONAL NEGATIVE: 1
EYES NEGATIVE: 1
SENSORY CHANGE: 1
RESPIRATORY NEGATIVE: 1
SEIZURES: 0
FOCAL WEAKNESS: 1
HEADACHES: 1
PSYCHIATRIC NEGATIVE: 1
SPEECH CHANGE: 1
GASTROINTESTINAL NEGATIVE: 1

## 2023-02-27 ASSESSMENT — COGNITIVE AND FUNCTIONAL STATUS - GENERAL
DAILY ACTIVITIY SCORE: 6
STANDING UP FROM CHAIR USING ARMS: TOTAL
PERSONAL GROOMING: TOTAL
CLIMB 3 TO 5 STEPS WITH RAILING: TOTAL
SUGGESTED CMS G CODE MODIFIER MOBILITY: CM
MOVING TO AND FROM BED TO CHAIR: A LOT
HELP NEEDED FOR BATHING: TOTAL
TURNING FROM BACK TO SIDE WHILE IN FLAT BAD: A LOT
DRESSING REGULAR UPPER BODY CLOTHING: TOTAL
EATING MEALS: TOTAL
WALKING IN HOSPITAL ROOM: TOTAL
SUGGESTED CMS G CODE MODIFIER DAILY ACTIVITY: CN
DRESSING REGULAR LOWER BODY CLOTHING: TOTAL
MOBILITY SCORE: 8
TOILETING: TOTAL
MOVING FROM LYING ON BACK TO SITTING ON SIDE OF FLAT BED: UNABLE

## 2023-02-27 ASSESSMENT — PAIN DESCRIPTION - PAIN TYPE
TYPE: ACUTE PAIN

## 2023-02-27 ASSESSMENT — COPD QUESTIONNAIRES
COPD SCREENING SCORE: 2
DO YOU EVER COUGH UP ANY MUCUS OR PHLEGM?: NO/ONLY WITH OCCASIONAL COLDS OR INFECTIONS
DURING THE PAST 4 WEEKS HOW MUCH DID YOU FEEL SHORT OF BREATH: NONE/LITTLE OF THE TIME
HAVE YOU SMOKED AT LEAST 100 CIGARETTES IN YOUR ENTIRE LIFE: NO/DON'T KNOW

## 2023-02-27 ASSESSMENT — FIBROSIS 4 INDEX
FIB4 SCORE: 2.37
FIB4 SCORE: 2.58

## 2023-02-27 ASSESSMENT — LIFESTYLE VARIABLES: DO YOU DRINK ALCOHOL: NO

## 2023-02-27 ASSESSMENT — PATIENT HEALTH QUESTIONNAIRE - PHQ9
SUM OF ALL RESPONSES TO PHQ9 QUESTIONS 1 AND 2: 0
2. FEELING DOWN, DEPRESSED, IRRITABLE, OR HOPELESS: NOT AT ALL
1. LITTLE INTEREST OR PLEASURE IN DOING THINGS: NOT AT ALL

## 2023-02-27 NOTE — CONSULTS
Neurology STROKE CODE H&P  Neurohospitalist Service, [facility]    Referring Physician: Aramis Smith M.D.    STROKE CODE:   Chief complaint:  Acute right-sided weakness involving face, arm and leg.      To obtain the most accurate data regarding the time called, and time patient seen, refer to the stroke run-sheet and chart.  For time of CT, refer to the radiology report. See A&P below for TPA Decision and door to needle time if and when applicable.    HPI: Janine Torrez is an 83 y.o. right-handed female with history of previous stroke with residual left hand numbness, dementia and possibly cardiac arrhythmias currently on Eliquis who was brought to emergency room for evaluation of acute onset of right-sided weakness involving face arm and leg at around 1 AM.  Apparently there were at the [facility] for a family event when all of a sudden she had sudden onset of right-sided weakness.  Paramedics were called and patient was brought to emergency room emergently and underwent a brain CT which revealed left thalamic intraparenchymal hemorrhage.  Her granddaughter is at bedside and provide most of the history.  Her blood pressure at the scene was 157/88.      Review of systems: In addition to what is detailed in the HPI above, all other systems reviewed and are negative.    Past Medical History:    has a past medical history of Hypertension.    FHx:  family history is not on file.    SHx:   reports that she has never smoked. She has never used smokeless tobacco. She reports that she does not drink alcohol and does not use drugs.    Allergies:  No Known Allergies    Medications:    Current Facility-Administered Medications:     prothrombin complex conc human (Kcentra) IV Kit 3,500 Units, 50 Units/kg (Order-Specific), Intravenous, Once, Aramis Smith M.D.    labetalol (NORMODYNE/TRANDATE) injection 10 mg, 10 mg, Intravenous, Once, Aramis Smith M.D.    Current Outpatient  Medications:     fluconazole (DIFLUCAN) 150 MG tablet, Take 1 Tablet by mouth every day., Disp: 1 Tablet, Rfl: 0    hydrocortisone 1 % Cream, Apply 1 Application topically 2 times a day., Disp: 1.5 g, Rfl: 0    hydrocortisone (ANUSOL-HC) 25 MG Suppos, Insert 1 Suppository into the rectum every 12 hours., Disp: 12 Suppository, Rfl: 0    losartan (COZAAR) 50 MG Tab, TAKE 1 TABLET BY MOUTH DAILY, Disp: , Rfl: 1    losartan-hydrochlorothiazide (HYZAAR) 50-12.5 MG per tablet, Take 1 Tab by mouth every day., Disp: , Rfl:     alendronate (FOSAMAX) 70 MG Tab, Take 70 mg by mouth every 7 days., Disp: , Rfl:     Physical Examination:    There were no vitals filed for this visit.    General:   Patient is awake and in no acute distress  Neck: Full range of motion  Eyes: Midline, Pupils reactive to light.  CV: RRR  Lungs: No respiratory distress  Extremities: No cyanosis, warm, no significant edema.    NEUROLOGICAL EXAM:   Mental status: Awake, alert but completely disoriented and somewhat confused.  She is able to follow simple commands in [place].    Speech and language: speech is slightly dysarthric but comprehendible.  The patient is able to name and repeat.  Cranial nerve exam: Pupils are equal, round and reactive to light bilaterally. Visual fields are full. Extraocular muscles are intact. Sensation in the face is decreased to light touch.  She has mild right facial droop. Hearing to finger rub equal. Palate elevates symmetrically. Shoulder shrug is full. Tongue is midline.  Motor exam: Sustain antigravity in left upper and lower extremities with no downward drift.  She has no movement in right upper and lower extremity.  Tone is normal. No abnormal movements were seen on exam.  Sensory exam: She has sensory loss in right upper and lower extremity including face.  Coordination: no gross ataxia noted on exam  Plantar reflexes: Equivocal  Gait: deferred    NIH Stroke Scale:    1a. Level of Consciousness (Alert, drowsy, etc):  0= Alert    1b. LOC Questions (Month, age): 2= Incorrect    1c. LOC Commands (Open/close eyes make fist/let go): 2= Incorrect    2.   Best Gaze (Eyes open - patient follows examiner's finger on face): 0= Normal    3.   Visual Fields (introduce visual stimulus/threat to patient's field quadrants): 0= No visual loss  4.   Facial Paresis (Show teeth, raise eyebrows and squeeze eyes shut): 1= Minor     5a. Motor Arm - Left (Elevate arm to 90 degrees if patient is sitting, 45 degrees if  supine): 0= No drift    5b. Motor Arm - Right (Elevate arm to 90 degrees if patient is sitting, 45 degrees if supine): 4= No movement    6a. Motor Leg - Left (Elevate leg 30 degrees with patient supine): 0= No drift    6b. Motor Leg - Right  (Elevate leg 30 degrees with patient supine): 4= No movement    7.   Limb Ataxia (Finger-nose, heel down shin): 0= No ataxia    8.   Sensory (Pin prick to face, arm, trunk and leg - compare side to side): 2- Severe loss    9.  Best Language (Name item, describe a picture and read sentences): 1= Mild to moderate aphasia    10. Dysarthria (Evaluate speech clarity by patient repeating listed words): 1= Mild to moderate slurring    11. Extinction and Inattention (Use information from prior testing to identify neglect or  double simultaneous stimuli testing): 0= No neglect    Total NIH Score: 16    Baseline modified [place] Scale (MRS): 1 = No significant disability, despite symptoms; able to perform all usual duties and activities    INTRACEREBRAL HEMORRHAGE SCORE:    ICH SCORE:  Gates Coma Score:  14  Age:  83  ICH Volume (using ABC/2 Formula) greater than 30cc =no 3.4cc  Intraventricular Hemorrhage:  No  Infratentorial Origin of Hemorrhage:  NO  ICH TOTAL SCORE:   1    Objective Data:    Labs:  Lab Results   Component Value Date/Time    PROTHROMBTM 14.9 (H) 01/28/2023 01:48 AM    INR 1.18 (H) 01/28/2023 01:48 AM      Lab Results   Component Value Date/Time    WBC 5.7 01/28/2023 01:48 AM    RBC 4.45  01/28/2023 01:48 AM    HEMOGLOBIN 13.3 01/28/2023 01:48 AM    HEMATOCRIT 40.7 01/28/2023 01:48 AM    MCV 91.5 01/28/2023 01:48 AM    MCH 29.9 01/28/2023 01:48 AM    MCHC 32.7 (L) 01/28/2023 01:48 AM    MPV 8.8 (L) 01/28/2023 01:48 AM    NEUTSPOLYS 64.30 01/28/2023 01:48 AM    LYMPHOCYTES 27.80 01/28/2023 01:48 AM    MONOCYTES 5.90 01/28/2023 01:48 AM    EOSINOPHILS 1.00 01/28/2023 01:48 AM    BASOPHILS 0.50 01/28/2023 01:48 AM      Lab Results   Component Value Date/Time    SODIUM 140 01/28/2023 01:48 AM    POTASSIUM 3.9 01/28/2023 01:48 AM    CHLORIDE 107 01/28/2023 01:48 AM    CO2 23 01/28/2023 01:48 AM    GLUCOSE 112 (H) 01/28/2023 01:48 AM    BUN 25 (H) 01/28/2023 01:48 AM    CREATININE 1.08 01/28/2023 01:48 AM      Lab Results   Component Value Date/Time    CHOLSTRLTOT 98 (L) 08/13/2021 11:25 AM    LDL 32 08/13/2021 11:25 AM    HDL 51 08/13/2021 11:25 AM    TRIGLYCERIDE 77 08/13/2021 11:25 AM       Lab Results   Component Value Date/Time    ALKPHOSPHAT 130 (H) 01/28/2023 01:48 AM    ASTSGOT 16 01/28/2023 01:48 AM    ALTSGPT 8 01/28/2023 01:48 AM    TBILIRUBIN 0.3 01/28/2023 01:48 AM        Imaging/Testing:    I interpreted and/or reviewed the patient's neuroimaging    CT-HEAD W/O    (Results Pending)   CT-CTA HEAD WITH & W/O-POST PROCESS    (Results Pending)   CT-CTA NECK WITH & W/O-POST PROCESSING    (Results Pending)       Assessment:  Janine Torrez is a 83 y.o. with history of previous stroke, currently on Eliquis who was brought to emergency room for acute onset of right-sided weakness and numbness with onset at 1 AM while she was at [facility] [employer].   She has a left thalamic hemorrhage.  Her ICH score is 1.      Plan:  -q1h and PRN neuro assessment. VS per nursing/unit protocol.   -Maintain systolic blood pressure less than 140, antihypertensives per primary team.  Prefer Cardene drip  -Patient is on Eliquis, pharmacy was contacted to reverse with Kcentra  -Obtain MRI Brain w/wo contrast.   -Telemetry;  currently SR.   -Head of bed elevated at 30 degrees.  - Maintain bowel regimen to avoid Valsalva maneuver and increased intracranial pressure.  -Maintain normoglycemia, normothermia and eunatremia  - Avoid antiplatelet and anticoagulant.  - Her hemorrhage is small with ICH score of 1, does not need surgical intervention at this point, however recommend to consult neurosurgery in a.m. to be on board in case of deterioration and expansion of hemorrhage.  - Repeat head CT without contrast in 6 hours  -Recommend aggressive BG management per primary team. Avoid IVF with Dextrose. -BG goal . Check hemoglobin A1c.  Goal < 7  -PT/OT/SLP eval and treat for early mobilization if blood pressure is stable.  -All other medical management per primary team.   -DVT PPX: SCDs.      The plan of care above has been discussed with Aramis Smith M.D.    Upon my evaluation, this patient had a high probability of imminent or life-threatening deterioration due to intraparenchymal hemorrhage which required my direct attention, intervention, and personal management.  I personally provided 55 minutes of total critical care time outside of time spent on separately billable/documented procedures. Time includes: review of laboratory data, review of radiology studies, discussion with consultants, discussion with family/patient, monitoring for potential decompensation.  Interventions were performed as documented in the chart.      Please note that this dictation was created using voice recognition software. I have made every reasonable attempt to correct obvious errors, but I expect that there are errors of grammar and possibly content that I did not discover before finalizing the note.       Molly Galloway MD  [facility]

## 2023-02-27 NOTE — PROGRESS NOTES
Med Rec complete per patient's home pharmacies  No oral antibiotics in the last 30 days per pharmacies  Allergies reviewed  Preferred Pharmacy: [facility] on [facility]     Called [facility] on [facility], [facility] on [address] [facility] and [facility] on [facility] [facility] - none of these pharmacies had Eliquis on file for patient. Spoke to family @ bedside, they are not sure where that came from but were going to try to reach out to the patient's doctor for confirmation. They don't believe she would use any other pharmacy.

## 2023-02-27 NOTE — ASSESSMENT & PLAN NOTE
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  - CTa head/neck no evidence of large vessel occlusion  - KCentra being given in ED; pt on Eliquis for unknown reason - likely cardiac  - Obtain TEG 2-3 hrs after KCentra is completed  - Repeat CT head in 6hrs  - MRI brain w/ & w/o today  - Check A1c, Lipid panel  - Q1h neuro checks  - Keep euvolemic, eunatremic, euglycemic  - Neurology following/consulted  - 2D Echo  - Consult NSGY in am; per Dr. Galloway no need to consult NSGY urgently at this time

## 2023-02-27 NOTE — ASSESSMENT & PLAN NOTE
- Admission EKG  - Echo pending; on 6/13/23 LVEF=60-65% w/ mild aortic sclerosis w/o stenosis  - Resume home antihypertensives when able to take PO  - Monitor I&O  - Cardiac diet when appropriate  - Control pain

## 2023-02-27 NOTE — PROGRESS NOTES
Denzelughter updated this RN regarding medications and pharmacy. Patient picks up med at [facility] in [facility], [facility] on [facility], and some at [facility].

## 2023-02-27 NOTE — ASSESSMENT & PLAN NOTE
Per chart review, pt was on Lipitor   - Obtain Lipid panel  - Statin therapy when able to take PO; evaluate LFTs  - Counseling on lifestyle/dietary modifications

## 2023-02-27 NOTE — PROGRESS NOTES
83 year old female admitted with  L sided thalamic and posterior internal capsule hemorrhage in the setting of hypertension.    On exam she awakes, regards, follows, moves L sided normally, R side is weak but antigravity, abd s/nt, brisk cap refill, no respiratory distress.    Family updated at bedside, they are discussing goals of care    ICH  Bleed is small and repeat CTH stable; hold off on NSGY consult   Nicardipine for SBP < 140  Neuro checks  No AP/VTE ppx  S/p K centra  Maintain normal glucose and temperature  Neuro following   SLP/PT    The patient remains critically ill.  Critical care time = 48 minutes in directly providing and coordinating critical care and extensive data review.  No time overlap and excludes procedures.

## 2023-02-27 NOTE — ED NOTES
Pt transported to RICU with ACLS RN and ICU CNA with all belongings. Vitals stable for transport. PT on cardiac monitor.

## 2023-02-27 NOTE — CONSULTS
Physical Medicine and Rehabilitation Consultation              Date of initial consultation: 2/27/2023  Consulting provider: Judith Kimball  Reason for consultation: assess for acute inpatient rehab appropriateness  LOS: 0 Day(s)    Chief complaint: Left thalamic hemorrhage    HPI: The patient is a 83 y.o. right hand dominant female with a past medical history of prior stroke with residual left hand numbness, dementia, cardiac arrhythmias on Eliquis;  who presented on 2/27/2023  1:32 AM with right-sided weakness involving the face, arm and leg.  CT head found left thalamic intraparenchymal hemorrhage.  NIH score 16, ICH score 1.  Patient's Eliquis was reversed with Kcentra.  MR brain is pending.  Therapy evaluations are pending.    Patient is Pakistani-speaking only and the nurses used for interpretation services. The patient currently reports that she feels bad, reports left hand pain, denies right-sided weakness, but on exam has right-sided weakness.  Patient is joined in the room by her son and grandson.  Patient has a cough with secretions being managed by suction.    ROS  Pertinent positives are mentioned in the HPI, all others reviewed and are negative.    Social Hx:  2 SH  0 ZACHARY, 12-14 steps inside required  With: Daughter and son-in-law    Patient has good family support and will have 24/7 assistance if needed    THERAPY:  Restrictions: Fall risk, swallow precautions  PT: Functional mobility   Pending    OT: ADLs  Pending    SLP:   Pending    IMAGING:  CT head 2/27/2023  1.  Cerebral atrophy.  2.  White matter lucencies most consistent with small vessel ischemic change versus demyelination or gliosis.  3. Stable 1.5 cm ovoid area of intraparenchymal hemorrhage in the left basal ganglia.  4. Chronic multifocal areas of lacunar type infarction noted in the basal ganglia..  5. Small chronic area of infarction in the right posterior frontal lobe.    PROCEDURES:  None    PMH:  Past Medical History:   Diagnosis  "Date    Hypertension        PSH:  Past Surgical History:   Procedure Laterality Date    CATARACT PHACO WITH IOL Left 8/13/2018    Procedure: CATARACT PHACO WITH IOL;  Surgeon: Edwardo Saeed M.D.;  Location: SURGERY SAME DAY [facility];  Service: Ophthalmology       FHX:  Non-pertinent to today's issues    Medications:  Current Facility-Administered Medications   Medication Dose    niCARdipine (CARDENE) 25 mg in  mL Standard Infusion  0-15 mg/hr    Respiratory Therapy Consult      LORazepam (ATIVAN) injection 2 mg  2 mg    ondansetron (ZOFRAN ODT) dispertab 4 mg  4 mg    Or    ondansetron (ZOFRAN) syringe/vial injection 4 mg  4 mg    labetalol (NORMODYNE/TRANDATE) injection 10 mg  10 mg    hydrALAZINE (APRESOLINE) injection 10 mg  10 mg    insulin regular (HumuLIN R,NovoLIN R) injection  1-6 Units    And    dextrose 10 % BOLUS 25 g  25 g    senna-docusate (PERICOLACE or SENOKOT S) 8.6-50 MG per tablet 2 Tablet  2 Tablet    And    polyethylene glycol/lytes (MIRALAX) PACKET 1 Packet  1 Packet    And    magnesium hydroxide (MILK OF MAGNESIA) suspension 30 mL  30 mL    And    bisacodyl (DULCOLAX) suppository 10 mg  10 mg    acetaminophen (Tylenol) tablet 650 mg  650 mg    Or    acetaminophen (TYLENOL) suppository 650 mg  650 mg    norepinephrine (Levophed) 8 mg in 250 mL NS infusion (premix)  0-1 mcg/kg/min (Order-Specific)       Allergies:  No Known Allergies      Physical Exam:  Vitals: /68   Pulse 87   Temp 36.2 °C (97.1 °F) (Temporal)   Resp (!) 23   Ht 1.499 m (4' 11\")   Wt 69.4 kg (153 lb)   SpO2 93%   Gen: NAD  Head: NC/AT  Eyes/ Nose/ Mouth:  moist mucous membranes  Cardio: RRR, good distal perfusion, warm extremities  Pulm: normal respiratory effort, no cyanosis   Abd: Soft NTND, negative borborygmi   Ext: No peripheral edema. No calf tenderness. No clubbing.    Mental status: answers questions appropriately follows commands  Speech: fluent, no aphasia or dysarthria    CRANIAL NERVES:  2,3: " visual acuity grossly intact, PERRL  3,4,6: EOMI bilaterally, no nystagmus or diplopia  5: sensation intact to light touch bilaterally and symmetric  7: no facial asymmetry  8: hearing grossly intact  9,10: symmetric palate elevation  11: SCM/Trapezius strength 5/5 bilaterally  12: tongue protrudes midline    Motor:      Upper Extremity  Myotome R L   Shoulder flexion C5 4/5 5   Elbow flexion C5 4/5 5   Wrist extension C6 4/5 5   Elbow extension C7 4/5 5   Finger flexion C8 4/5 5   Finger abduction T1 4/5 5     Lower Extremity Myotome R L   Hip flexion L2 3/5 5   Knee extension L3 5 5   Ankle dorsiflexion L4 1/5 5   Toe extension L5 1/5 5   Ankle plantarflexion S1 2/5 5     Sensory:   intact to light touch through out    DTRs:  Right  Left    Brachioradialis  2+  2+   Patella tendon  2+ 2+     No clonus at bilateral ankles  Negative babinski b/l  Negative Agarwal b/l     Tone: no spasticity noted, no cogwheeling noted    Coordination:   Altered finger linnea right      Labs: Reviewed and significant for   Recent Labs     02/27/23  0135   RBC 4.53   HEMOGLOBIN 13.4   HEMATOCRIT 40.7   PLATELETCT 199   PROTHROMBTM 19.8*   APTT 38.1*   INR 1.72*     Recent Labs     02/27/23  0135   SODIUM 137   POTASSIUM 3.3*   CHLORIDE 108   CO2 19*   GLUCOSE 86   BUN 21   CREATININE 0.77   CALCIUM 8.1*     Recent Results (from the past 24 hour(s))   CBC WITH DIFFERENTIAL    Collection Time: 02/27/23  1:35 AM   Result Value Ref Range    WBC 6.6 4.8 - 10.8 K/uL    RBC 4.53 4.20 - 5.40 M/uL    Hemoglobin 13.4 12.0 - 16.0 g/dL    Hematocrit 40.7 37.0 - 47.0 %    MCV 89.8 81.4 - 97.8 fL    MCH 29.6 27.0 - 33.0 pg    MCHC 32.9 (L) 33.6 - 35.0 g/dL    RDW 45.2 35.9 - 50.0 fL    Platelet Count 199 164 - 446 K/uL    MPV 9.3 9.0 - 12.9 fL    Neutrophils-Polys 72.20 (H) 44.00 - 72.00 %    Lymphocytes 22.60 22.00 - 41.00 %    Monocytes 3.50 0.00 - 13.40 %    Eosinophils 1.70 0.00 - 6.90 %    Basophils 0.00 0.00 - 1.80 %    Nucleated RBC 0.00 /100  WBC    Neutrophils (Absolute) 4.77 2.00 - 7.15 K/uL    Lymphs (Absolute) 1.49 1.00 - 4.80 K/uL    Monos (Absolute) 0.23 0.00 - 0.85 K/uL    Eos (Absolute) 0.11 0.00 - 0.51 K/uL    Baso (Absolute) 0.00 0.00 - 0.12 K/uL    NRBC (Absolute) 0.00 K/uL   COMP METABOLIC PANEL    Collection Time: 02/27/23  1:35 AM   Result Value Ref Range    Sodium 137 135 - 145 mmol/L    Potassium 3.3 (L) 3.6 - 5.5 mmol/L    Chloride 108 96 - 112 mmol/L    Co2 19 (L) 20 - 33 mmol/L    Anion Gap 10.0 7.0 - 16.0    Glucose 86 65 - 99 mg/dL    Bun 21 8 - 22 mg/dL    Creatinine 0.77 0.50 - 1.40 mg/dL    Calcium 8.1 (L) 8.5 - 10.5 mg/dL    AST(SGOT) 21 12 - 45 U/L    ALT(SGPT) 11 2 - 50 U/L    Alkaline Phosphatase 119 (H) 30 - 99 U/L    Total Bilirubin 0.4 0.1 - 1.5 mg/dL    Albumin 3.8 3.2 - 4.9 g/dL    Total Protein 6.4 6.0 - 8.2 g/dL    Globulin 2.6 1.9 - 3.5 g/dL    A-G Ratio 1.5 g/dL   PROTHROMBIN TIME    Collection Time: 02/27/23  1:35 AM   Result Value Ref Range    PT 19.8 (H) 12.0 - 14.6 sec    INR 1.72 (H) 0.87 - 1.13   APTT    Collection Time: 02/27/23  1:35 AM   Result Value Ref Range    APTT 38.1 (H) 24.7 - 36.0 sec   COD (ADULT)    Collection Time: 02/27/23  1:35 AM   Result Value Ref Range    ABO Grouping Only O     Rh Grouping Only POS     Antibody Screen-Cod NEG    TROPONIN    Collection Time: 02/27/23  1:35 AM   Result Value Ref Range    Troponin T 9 6 - 19 ng/L   ESTIMATED GFR    Collection Time: 02/27/23  1:35 AM   Result Value Ref Range    GFR (CKD-EPI) 77 >60 mL/min/1.73 m 2   CORRECTED CALCIUM    Collection Time: 02/27/23  1:35 AM   Result Value Ref Range    Correct Calcium 8.3 (L) 8.5 - 10.5 mg/dL   Magnesium    Collection Time: 02/27/23  1:35 AM   Result Value Ref Range    Magnesium 1.6 1.5 - 2.5 mg/dL   PHOSPHORUS    Collection Time: 02/27/23  1:35 AM   Result Value Ref Range    Phosphorus 2.3 (L) 2.5 - 4.5 mg/dL   DIFFERENTIAL MANUAL    Collection Time: 02/27/23  1:35 AM   Result Value Ref Range    Manual Diff Status  PERFORMED    PERIPHERAL SMEAR REVIEW    Collection Time: 23  1:35 AM   Result Value Ref Range    Peripheral Smear Review see below    PLATELET ESTIMATE    Collection Time: 23  1:35 AM   Result Value Ref Range    Plt Estimation Normal    MORPHOLOGY    Collection Time: 23  1:35 AM   Result Value Ref Range    RBC Morphology Present     Poikilocytosis 1+     Ovalocytes 1+    Lipid Profile    Collection Time: 23  1:35 AM   Result Value Ref Range    Cholesterol,Tot 104 100 - 199 mg/dL    Triglycerides 100 0 - 149 mg/dL    HDL 43 >=40 mg/dL    LDL 41 <100 mg/dL   EKG (NOW)    Collection Time: 23  3:12 AM   Result Value Ref Range    Report       [facility] Emergency Dept.    Test Date:  2023  Pt Name:    JUN BRICEÑO       Department: ER  MRN:        [id number]                      Room:       [facility]  Gender:     Female                       Technician: [id number]  :        1940                   Requested By:RAMÓN SOUZA  Order #:    [id number]                    Reading MD: RAMÓN SOUZA MD    Measurements  Intervals                                Axis  Rate:       97                           P:          21  ND:         171                          QRS:        -43  QRSD:       80                           T:          -13  QT:         374  QTc:        475    Interpretive Statements  Sinus rhythm  Left axis deviation  Low voltage, precordial leads  Consider anterior infarct  Compared to ECG 2023 01:38:07  Left-axis deviation now present  Low QRS voltage now present  Myocardial infarct finding still present  Electronically Signed On 2023 5:01:51 PST by RAMÓN HIGHTOWER MD     Platelet Mapping with Basic TEG    Collection Time: 23  6:00 AM   Result Value Ref Range    Reaction Time Initial-R 4.1 (L) 4.6 - 9.1 min    React Time Initial Hep 3.8 (L) 4.3 - 8.3 min    Clot Kinetics-K 1.5 0.8 - 2.1 min    Clot Angle-Angle 72.8 63.0 -  78.0 degrees    Maximum Clot Strength-MA 54.1 52.0 - 69.0 mm    TEG Functional Fibrinogen(MA) 17.6 15.0 - 32.0 mm    Lysis 30 minutes-LY30 0.1 0.0 - 2.6 %    % Inhibition ADP 22.9 (H) 0.0 - 17.0 %    % Inhibition AA 7.4 0.0 - 11.0 %    TEG Algorithm Link Algorithm    MAGNESIUM    Collection Time: 02/27/23  6:00 AM   Result Value Ref Range    Magnesium 1.8 1.5 - 2.5 mg/dL   POCT glucose device results    Collection Time: 02/27/23  6:03 AM   Result Value Ref Range    POC Glucose, Blood 100 (H) 65 - 99 mg/dL         ASSESSMENT:  Patient is a 83 y.o. female admitted with left thalamic hemorrhage     [facility] Code / Diagnosis to Support: 0001.2 - Stroke: Right Body Involvement (Left Brain)    Rehabilitation: Impaired ADLs and mobility  Patient is a good candidate for inpatient rehab based on needs for PT, OT, and speech therapy.  Patient will also benefit from family training.  Patient has a good discharge situation which will be home with family.     Barriers to transfer include: Insurance authorization, TCCs to verify disposition, medical clearance and bed availability     All cases are subject to administrative review and recommendations may change    Disposition recommendations:  -Good candidate for IPR.  Patient is expected to have deficits with mobility, ADLs and potentially swallow as well.  -TCC to confirm discharge support  -TCC to follow for therapy evaluations and arrange transport when medically cleared  -PMR to follow in the periphery for rehab appropriateness, please reach out with questions or request for medical management    Medical Complexity:    Left thalamic hemorrhage  -NIH 16 today, ICH score 1  -Currently admitted to the ICU, pending therapy evaluations  -Per my exam, patient has deficits with mobility, ADLs, balance, coordination and potentially swallow.  -Appreciate management per primary team, repeat CT head is stable  -Eliquis was reversed with Kcentra, neurology recommending holding antiplatelet  and anticoagulant  -Systolic BP goal less than 140, currently on Cardene drip  -Awaiting PT OT evaluations  -Plan for IPR    Hypertension  -Currently controlled with Cardene drip  -Hydralazine and labetalol injection as needed  -Patient will likely need oral antihypertensive    Hyperglycemia  -Last A1c 5.2%, June 2021  -Repeat glycohemoglobin  -Sliding scale insulin    Hypokalemia  -Potassium 3.3  -Close observation replacement as needed by primary team    Cardiac arrhythmia  -Unclear if she has A-fib  -Previously on Eliquis, now reversed with Kcentra  -Would appreciate clarification from primary team on when to restart anticoagulation    DVT PPX: SCDs      Thank you for allowing us to participate in the care of this patient.     Patient was seen for 82 minutes on unit/floor of which > 50% of time was spent on counseling and coordination of care regarding the above, including prognosis, risk reduction, benefits of treatment, and options for next stage of care.    Joseph Barclay, DO   Physical Medicine and Rehabilitation     Please note that this dictation was created using voice recognition software. I have made every reasonable attempt to correct obvious errors, but there may be errors of grammar and possibly content that I did not discover before finalizing the note.

## 2023-02-27 NOTE — ASSESSMENT & PLAN NOTE
CKD stage 3a from chart review  - Trend chemistry, renal function  - Correct electrolyte imbalances  - Strict I&O  - Avoid nephrotoxic drugs  - Consider nephrology consultation, if appropriate

## 2023-02-27 NOTE — CARE PLAN
The patient is Watcher - Medium risk of patient condition declining or worsening    Shift Goals  Clinical Goals: stable neuros, SBP <140, >100  Patient Goals: NONI  Family Goals: No family present    Progress made toward(s) clinical / shift goals:    Problem: Knowledge Deficit - Standard  Goal: Patient and family/care givers will demonstrate understanding of plan of care, disease process/condition, diagnostic tests and medications  Outcome: Progressing     Problem: Skin Integrity  Goal: Skin integrity is maintained or improved  Outcome: Progressing     Problem: Fall Risk  Goal: Patient will remain free from falls  Outcome: Progressing     Problem: Pain - Standard  Goal: Alleviation of pain or a reduction in pain to the patient’s comfort goal  Outcome: Progressing       Patient is not progressing towards the following goals:

## 2023-02-27 NOTE — ED NOTES
Unable to obtain med rec at this time  Family at bedside is unsure of medications, and state their aunt handles the patients medications  Home pharmacy ([facility]) is closed, unable to verify at this time

## 2023-02-27 NOTE — ED PROVIDER NOTES
"ED Provider Note    CHIEF COMPLAINT  Chief Complaint   Patient presents with    Possible Stroke     Pt was at [facility] and experienced right sided weakness, dizziness, and slurred speech per patient's granddaughter       EXTERNAL RECORDS REVIEWED  Inpatient Notes  , Outpatient Notes  , and External ED Note      HPI/ROS  LIMITATION TO HISTORY   Select: : None  OUTSIDE HISTORIAN(S):  Family grand daughter at bedside     Janine Torrez is a 83 y.o. female who presents to the emergency department with family members with acute onset of right-sided facial droop and right-sided weakness.  Patient was at [facility] this evening at approximately 1 AM she became increasingly altered with right-sided facial droop and right upper right lower extremity weakness inability to ambulate EMS was called and patient was transported here for further evaluation and treatment.  Family members report previous stroke with some minimal residual paresthesias but no extremity weakness or other deficit.  Patient is on Eliquis after this previous stroke.  No recent surgeries no other abnormal bleeding no other acute symptom change or concern.    PAST MEDICAL HISTORY   has a past medical history of Hypertension.    SURGICAL HISTORY   has a past surgical history that includes cataract phaco with iol (Left, 8/13/2018).    FAMILY HISTORY  No family history on file.    SOCIAL HISTORY  Social History     Tobacco Use    Smoking status: Never    Smokeless tobacco: Never   Substance and Sexual Activity    Alcohol use: No    Drug use: No    Sexual activity: Not on file       CURRENT MEDICATIONS  Home Medications    **Home medications have not yet been reviewed for this encounter**         ALLERGIES  No Known Allergies    PHYSICAL EXAM  VITAL SIGNS: BP (!) 140/94   Pulse 93   Temp 36.2 °C (97.1 °F) (Temporal)   Resp (!) 32   Ht 1.499 m (4' 11\")   Wt 69.4 kg (153 lb)   SpO2 94%   BMI 30.90 kg/m²    Pulse ox interpretation: I interpret this pulse " ox as normal.  Constitutional: Alert and oriented x 3, moderate distress  HEENT: Atraumatic normocephalic, right-sided facial droop sparing the forehead pupils are equal round reactive to light extraocular movements are intact. The nares is clear, external ears are normal, mouth shows moist mucous membranes normal dentition for age  Neck: Supple, no JVD no tracheal deviation  Cardiovascular: Regular rate and rhythm no murmur rub or gallop 2+ pulses peripherally x4  Thorax & Lungs: No respiratory distress, no wheezes rales or rhonchi, No chest tenderness.   GI: Soft nontender nondistended positive bowel sounds, no peritoneal signs  Skin: Warm dry no acute rash or lesion  Musculoskeletal: Moving all extremities with full range and 5 of 5 strength no acute  deformity  Neurologic: Minor right-sided hemineglect right-sided facial droop right upper right lower extremity weakness Minor dysarthria  Psychiatric: Appropriate affect for situation at this time      DIAGNOSTIC STUDIES / PROCEDURES  Results for orders placed or performed during the hospital encounter of 02/27/23   CBC WITH DIFFERENTIAL   Result Value Ref Range    WBC 6.6 4.8 - 10.8 K/uL    RBC 4.53 4.20 - 5.40 M/uL    Hemoglobin 13.4 12.0 - 16.0 g/dL    Hematocrit 40.7 37.0 - 47.0 %    MCV 89.8 81.4 - 97.8 fL    MCH 29.6 27.0 - 33.0 pg    MCHC 32.9 (L) 33.6 - 35.0 g/dL    RDW 45.2 35.9 - 50.0 fL    Platelet Count 199 164 - 446 K/uL    MPV 9.3 9.0 - 12.9 fL    Neutrophils-Polys 72.20 (H) 44.00 - 72.00 %    Lymphocytes 22.60 22.00 - 41.00 %    Monocytes 3.50 0.00 - 13.40 %    Eosinophils 1.70 0.00 - 6.90 %    Basophils 0.00 0.00 - 1.80 %    Nucleated RBC 0.00 /100 WBC    Neutrophils (Absolute) 4.77 2.00 - 7.15 K/uL    Lymphs (Absolute) 1.49 1.00 - 4.80 K/uL    Monos (Absolute) 0.23 0.00 - 0.85 K/uL    Eos (Absolute) 0.11 0.00 - 0.51 K/uL    Baso (Absolute) 0.00 0.00 - 0.12 K/uL    NRBC (Absolute) 0.00 K/uL   COMP METABOLIC PANEL   Result Value Ref Range    Sodium  137 135 - 145 mmol/L    Potassium 3.3 (L) 3.6 - 5.5 mmol/L    Chloride 108 96 - 112 mmol/L    Co2 19 (L) 20 - 33 mmol/L    Anion Gap 10.0 7.0 - 16.0    Glucose 86 65 - 99 mg/dL    Bun 21 8 - 22 mg/dL    Creatinine 0.77 0.50 - 1.40 mg/dL    Calcium 8.1 (L) 8.5 - 10.5 mg/dL    AST(SGOT) 21 12 - 45 U/L    ALT(SGPT) 11 2 - 50 U/L    Alkaline Phosphatase 119 (H) 30 - 99 U/L    Total Bilirubin 0.4 0.1 - 1.5 mg/dL    Albumin 3.8 3.2 - 4.9 g/dL    Total Protein 6.4 6.0 - 8.2 g/dL    Globulin 2.6 1.9 - 3.5 g/dL    A-G Ratio 1.5 g/dL   PROTHROMBIN TIME   Result Value Ref Range    PT 19.8 (H) 12.0 - 14.6 sec    INR 1.72 (H) 0.87 - 1.13   APTT   Result Value Ref Range    APTT 38.1 (H) 24.7 - 36.0 sec   COD (ADULT)   Result Value Ref Range    ABO Grouping Only O     Rh Grouping Only POS     Antibody Screen-Cod NEG    TROPONIN   Result Value Ref Range    Troponin T 9 6 - 19 ng/L   ESTIMATED GFR   Result Value Ref Range    GFR (CKD-EPI) 77 >60 mL/min/1.73 m 2   CORRECTED CALCIUM   Result Value Ref Range    Correct Calcium 8.3 (L) 8.5 - 10.5 mg/dL   DIFFERENTIAL MANUAL   Result Value Ref Range    Manual Diff Status PERFORMED    PERIPHERAL SMEAR REVIEW   Result Value Ref Range    Peripheral Smear Review see below    PLATELET ESTIMATE   Result Value Ref Range    Plt Estimation Normal    MORPHOLOGY   Result Value Ref Range    RBC Morphology Present     Poikilocytosis 1+     Ovalocytes 1+    EKG (NOW)   Result Value Ref Range    Report       [facility] Emergency Dept.    Test Date:  2023  Pt Name:    JUN BRICEÑO       Department: ER  MRN:        [id number]                      Room:       [facility]  Gender:     Female                       Technician: [id number]  :        1940                   Requested By:RAMÓN SOUZA  Order #:    [id number]                    Reading MD: RAMÓN SOUZA MD    Measurements  Intervals                                Axis  Rate:       97                            P:          21  AK:         171                          QRS:        -43  QRSD:       80                           T:          -13  QT:         374  QTc:        475    Interpretive Statements  Sinus rhythm  Left axis deviation  Low voltage, precordial leads  Consider anterior infarct  Compared to ECG 01/28/2023 01:38:07  Left-axis deviation now present  Low QRS voltage now present  Myocardial infarct finding still present  Electronically Signed On 2- 5:01:51 PST by RAMÓN HIGHTOWER MD           RADIOLOGY  DX-CHEST-PORTABLE (1 VIEW)   Final Result      1.  There is no acute cardiopulmonary process.      CT-CTA NECK WITH & W/O-POST PROCESSING   Final Result      1.  Mild bilateral atherosclerosis with less than 50% ICA stenosis.      CT-CTA HEAD WITH & W/O-POST PROCESS   Final Result      1.  CT angiogram of the [place] demonstrate no evidence of large vessel occlusion.      CT-HEAD W/O   Final Result      1.  There is a left thalamic and posterior limb internal capsule focal area of hemorrhage.   2.  There is an old right posterior frontoparietal infarct with encephalomalacia.   3.  There is diffuse atrophy.   4.  Nonspecific white matter disease most consistent with chronic small vessel ischemic change again seen.      A secure VOALTE message was sent and confirmed received to RAMÓN SOUZA on 2/27/2023 at 1:50 AM.         CT-HEAD W/O    (Results Pending)         COURSE & MEDICAL DECISION MAKING      INITIAL ASSESSMENT, COURSE AND PLAN      Care Narrative: 83-year-old female cute onset right-sided weakness CT scan results and left-sided thalamic intracranial hematoma.  Patient is on Eliquis we have ordered Kcentra to reverse tight blood pressure control she has been placed on Cardene I discussed case with neurologist Dr. Erika Kern were in agreement up to this point.  Very small intracranial bleed neurosurgical interventions are unlikely neurosurgical consultation will be deferred  to the morning discussed case with intensivist Dr. Ashford and the patient's admitted to the ICU for ongoing management.  Admitted in guarded condition.    Critical care:  40 minutes of critical care time was spent with this patient.  Critical care time included the discussion with EMS, nursing staff,the patient, the patient's family members, and multiple consulting physicians.  This also includes time spent based on the initial evaluation and interventions based on such plus re-evaluation of the results of intervention.  Critical care time is based on presentation with critical emergency that still chance of deterioration or death if not for these interventions.  Does not include separately billable procedures.          FINAL DIAGNOSIS  1. Hemorrhagic stroke (HCC)    2.  Acute hemorrhagic CVA  3.  Right-sided deficit  4.  Chronic anticoagulation with intracranial hemorrhage  5.  Critical care 40 minutes       Electronically signed by: Aramis Smith M.D., 2/27/2023 1:43 AM

## 2023-02-27 NOTE — DISCHARGE PLANNING
[facility] Transitional Care Coordination    Referral from: SMITH Kimball    Insurance Provider on Facesheet: MCR/MICKEY FFS    Potential Rehab Diagnosis: CVA    Chart review indicates patient may have on going medical management and may have therapy needs to possibly meet inpatient rehab facility criteria with the goal of returning to community.    D/C support will need to be verified: Daughter    Physiatry consultation forwarded per protocol.  W/U & TX pending.     Thank you for the referral.

## 2023-02-27 NOTE — H&P
Critical Care History & Physical Note    Date of Service  2/27/2023    Primary Care Physician  Braden Jennings M.D.    Consultants  neurology    Specialist Names:  Dr. Nilesh GARCIA to be consulted in AM    Code Status  Full Code    Chief Complaint  Chief Complaint   Patient presents with    Possible Stroke     Pt was at [facility] and experienced right sided weakness, dizziness, and slurred speech per patient's granddaughter       History of Presenting Illness  Janine Torrez is a 83 y.o. female w/ PMHx s/f HTN, HLD, on Eliquis for unknown reasons, alzheimer's/dementia, prior CVA w/ residual left hand numbness, and CKD, stage 3a who presented 2/27/2023 with report of acute onset of facial numbness and right-sided weakness involving arm and leg at around 1 AM.  Apparently, they were at the [facility] for a family event when all of a sudden the patient had sudden onset of right-sided weakness and began to slump over to the right in her seat. When family attempted to help her, they noticed she was c/o dizziness and right sided weakness. The granddaughter at bedside reports she is also altered from her baseline in that the patient usually knows her own name and where she is and can identify her granddaughter. The patient is currently disoriented to person/place/time. On evaluation in the ED, the patient was noted to be hypertensive with -180s requiring nicardipine infusion for blood pressure control. CT head performed in the ED showed a left thalamic intraparenchymal hemorrhage.  Her initial NIHSS was 16; ICH score is 1. The patient is admitted to the ICU for close neurological evaluation and strict blood pressure control with a goal of -40. Due to the patient's history of taking Eliquis, Kcentra was administered in the ED. Of note, due to the patient's baseline mentation, the granddaughter is at bedside and provided some of the history, the rest being obtained by chart review. Per neurology,  neurosurgery can be consulted in the morning and does not need emergent consultation at this time due to the size of the H.     I discussed the plan of care with patient, family, bedside RN, and Dr. Ashford .    Review of Systems  Review of Systems   Unable to perform ROS: Dementia   Constitutional: Negative.    HENT: Negative.     Eyes: Negative.    Respiratory: Negative.     Cardiovascular: Negative.    Gastrointestinal: Negative.    Genitourinary: Negative.         Incontinence   Musculoskeletal: Negative.    Skin: Negative.    Neurological:  Positive for dizziness, sensory change (Right face, RUE/RLE), speech change (slurred), focal weakness (RLE progressed to RUE&RLE) and headaches. Negative for seizures.   Endo/Heme/Allergies: Negative.    Psychiatric/Behavioral: Negative.          Dementia/Alzheimers - granddaughter states she normally knows her own name & her granddaughter's name but she currently does not. She is disoriented to place & time as well     Past Medical History - Pt w/ dementia, granddaughter at bedside unaware of PMHx  Alzheimer's/Dementia w/ prior behavioral disturbance, Hypertension, prior CVA w/ residual L hand numbness  Per chart review: L cataract, HLD, poss prior MI, CKD-3a    Surgical History - Pt w/ dementia, granddaughter at bedside unaware of PSHx  Per chart review; cataract phaco with iol (Left, 8/13/2018), mention of possible L thyroglossal duct cyst removal    Family History- Pt w/ dementia, granddaughter at bedside unaware of FHx  Family history reviewed with patient. There is no family history that is pertinent to the chief complaint.     Social History  Per granddaughter & chart review, she has never smoked or used smokeless tobacco. She reports that she does not drink alcohol and does not use drugs.    Allergies  No Known Allergies    Medications  Prior to Admission Medications   Prescriptions Last Dose Informant Patient Reported? Taking?   alendronate (FOSAMAX) 70 MG Tab   Patient Yes No   Sig: Take 70 mg by mouth every 7 days.   fluconazole (DIFLUCAN) 150 MG tablet   No No   Sig: Take 1 Tablet by mouth every day.   hydrocortisone (ANUSOL-HC) 25 MG Suppos   No No   Sig: Insert 1 Suppository into the rectum every 12 hours.   hydrocortisone 1 % Cream   No No   Sig: Apply 1 Application topically 2 times a day.   losartan (COZAAR) 50 MG Tab   Yes No   Sig: TAKE 1 TABLET BY MOUTH DAILY   losartan-hydrochlorothiazide (HYZAAR) 50-12.5 MG per tablet  Patient Yes No   Sig: Take 1 Tab by mouth every day.      Facility-Administered Medications: None       Physical Exam  Pulse:  [75-82] 82  Resp:  [19-20] 20  BP: (132-181)/(62-88) 132/62  SpO2:  [89 %-93 %] 92 %  Blood Pressure : 132/62       Pulse: 82   Respiration: 20   Pulse Oximetry: 92 %       Physical Exam  Constitutional:       General: She is not in acute distress.     Appearance: Normal appearance.   HENT:      Head: Normocephalic and atraumatic.      Nose: Nose normal.      Mouth/Throat:      Mouth: Mucous membranes are moist.   Eyes:      General: No scleral icterus.     Extraocular Movements: Extraocular movements intact.      Pupils: Pupils are equal, round, and reactive to light.   Cardiovascular:      Rate and Rhythm: Normal rate and regular rhythm.      Pulses: Normal pulses.      Heart sounds: Normal heart sounds. No murmur heard.    No friction rub.   Pulmonary:      Effort: Pulmonary effort is normal. No respiratory distress.      Breath sounds: Normal breath sounds. No wheezing or rhonchi.   Abdominal:      General: Abdomen is flat. There is no distension.      Palpations: Abdomen is soft.      Tenderness: There is no abdominal tenderness. There is no guarding.   Genitourinary:     Comments: Deferred, no complaints  Musculoskeletal:         General: Normal range of motion.      Cervical back: Normal range of motion and neck supple.      Right lower leg: No edema.      Left lower leg: No edema.   Neurological:      Mental  Status: She is alert.       Laboratory:  Recent Labs     02/27/23 0135   WBC 6.6   RBC 4.53   HEMOGLOBIN 13.4   HEMATOCRIT 40.7   MCV 89.8   MCH 29.6   MCHC 32.9*   RDW 45.2   PLATELETCT 199   MPV 9.3     Recent Labs     02/27/23 0135   SODIUM 137   POTASSIUM 3.3*   CHLORIDE 108   CO2 19*   GLUCOSE 86   BUN 21   CREATININE 0.77   CALCIUM 8.1*     Recent Labs     02/27/23 0135   ALTSGPT 11   ASTSGOT 21   ALKPHOSPHAT 119*   TBILIRUBIN 0.4   GLUCOSE 86     Recent Labs     02/27/23 0135   APTT 38.1*   INR 1.72*     No results for input(s): NTPROBNP in the last 72 hours.      Recent Labs     02/27/23 0135   TROPONINT 9       Imaging:  DX-CHEST-PORTABLE (1 VIEW)   Final Result      1.  There is no acute cardiopulmonary process.      CT-CTA NECK WITH & W/O-POST PROCESSING   Final Result      1.  Mild bilateral atherosclerosis with less than 50% ICA stenosis.      CT-CTA HEAD WITH & W/O-POST PROCESS   Final Result      1.  CT angiogram of the [place] demonstrate no evidence of large vessel occlusion.      CT-HEAD W/O   Final Result      1.  There is a left thalamic and posterior limb internal capsule focal area of hemorrhage.   2.  There is an old right posterior frontoparietal infarct with encephalomalacia.   3.  There is diffuse atrophy.   4.  Nonspecific white matter disease most consistent with chronic small vessel ischemic change again seen.      A secure VOALTE message was sent and confirmed received to RAMÓN MALGORZATA SOUZA on 2/27/2023 at 1:50 AM.         CT-HEAD W/O    (Results Pending)       X-Ray:  I have personally reviewed the images and compared with prior images.  EKG:  I have personally reviewed the images and compared with prior images.    Assessment/Plan:  Justification for Admission Status    * Hemorrhagic stroke (HCC)- (present on admission)  Assessment & Plan  Patient was reportedly at [facility] when she had sudden onset slurred speech, right sided weakness and numbness to right face and  RUE/RLE  - Initial NIHSS on arrival was 16; ICH=1  - Ct head shows a left thalamic & posterior limb internal capsule focal area of hemorrhage and an old right posterior frontoparietal infarct with encephalomalacia.  - CTa head/neck no evidence of large vessel occlusion  - KCentra being given in ED; pt on Eliquis for unknown reason - likely cardiac  - Obtain TEG 2-3 hrs after KCentra is completed  - Repeat CT head in 6hrs  - MRI brain w/ & w/o today  - Check A1c, Lipid panel  - Q1h neuro checks  - Keep euvolemic, eunatremic, euglycemic  - Neurology following/consulted  - 2D Echo  - Consult NSGY in am; per Dr. Galloway no need to consult NSGY urgently at this time    Benign essential hypertension- (present on admission)  Assessment & Plan  - Admission EKG  - Echo pending; on 6/13/23 LVEF=60-65% w/ mild aortic sclerosis w/o stenosis  - Resume home antihypertensives when able to take PO  - Monitor I&O  - Cardiac diet when appropriate  - Control pain    Other hyperlipidemia  Assessment & Plan  Per chart review, pt was on Lipitor   - Obtain Lipid panel  - Statin therapy when able to take PO; evaluate LFTs  - Counseling on lifestyle/dietary modifications    Chronic renal failure- (present on admission)  Assessment & Plan  CKD stage 3a from chart review  - Trend chemistry, renal function  - Correct electrolyte imbalances  - Strict I&O  - Avoid nephrotoxic drugs  - Consider nephrology consultation, if appropriate    Alzheimer's dementia with behavioral disturbance (HCC)  Assessment & Plan  Per chart review patient was started on Seroquel at some point; patient's family member at bedside is unsure if she still takes this medication  - Establish day/night routines  - Reorient PRN  - Encourage family participation        VTE prophylaxis: SCDs/TEDs and pharmacologic prophylaxis contraindicated due to ICH.    Patient was critically ill during the time I treated the patient.    55 minutes of critical care time were spent, including  examination and interview of the patient, explanation of prognosis/diagnosis/plan of care, direction of nursing staff and discussion of the patient with other physicians involved.  This time was independent of procedures performed.    Greater than 50% of which was spent at the bedside.

## 2023-02-27 NOTE — ASSESSMENT & PLAN NOTE
Per chart review patient was started on Seroquel at some point; patient's family member at bedside is unsure if she still takes this medication  - Establish day/night routines  - Reorient PRN  - Encourage family participation

## 2023-02-27 NOTE — ED TRIAGE NOTES
"Chief Complaint   Patient presents with    Possible Stroke     Pt was at [facility] and experienced right sided weakness, dizziness, and slurred speech per patient's granddaughter      Pt BIB EMS from [facility] this evening for the above complaint. Per pt's granddaughter, these symptoms began around 0100 today. Pt has a history of previous stroke with residual numbness on her left hand.     Stroke protocol set ordered and pt roomed immediately following CT    /67   Pulse 79   Resp 20   Ht 1.499 m (4' 11\")   Wt 70.8 kg (156 lb)   SpO2 90%   BMI 31.51 kg/m²    "

## 2023-02-27 NOTE — DISCHARGE PLANNING
Care Transition Team Assessment    Ms. Fatima lives in a home with stairs with her daughter Norah.  Ms. Fatima has dementia and needed supervision for ADL's.  She does not have a PCP/prior HH services/DME.  Ms. Fatima does not have a MPOA and does not drink/smoke/drug/abuse.  She speaks Kinyarwanda, payor is medicare.     Information Source  Orientation Level: Oriented to person  Information Given By: Relative  Informant's Name: Porsche - daughter  Who is responsible for making decisions for patient? : Legal next of kin    Readmission Evaluation  Is this a readmission?: No    Elopement Risk  Legal Hold: No  Ambulatory or Self Mobile in Wheelchair: No-Not an Elopement Risk  Elopement Risk: Not at Risk for Elopement    Interdisciplinary Discharge Planning  Does Admitting Nurse Feel This Could be a Complex Discharge?: No  Primary Care Physician: None  Lives with - Patient's Self Care Capacity: Adult Children  Patient or legal guardian wants to designate a caregiver: No  Support Systems: Children  Housing / Facility: 2 Story House  Able to Return to Previous ADL's: No  Mobility Issues: Yes  Prior Services: None    Discharge Preparedness  Difficulity with ADLs:  (needed supervison)      Vision / Hearing Impairment  Vision Impairment : No  Hearing Impairment : No    Advance Directive  Advance Directive?: None    Domestic Abuse  Have you ever been the victim of abuse or violence?: No  Physical Abuse or Sexual Abuse: No  Possible Abuse/Neglect Reported to:: Not Applicable    Psychological Assessment  History of Substance Abuse: None

## 2023-02-28 PROBLEM — R13.12 OROPHARYNGEAL DYSPHAGIA: Status: ACTIVE | Noted: 2023-02-28

## 2023-02-28 PROBLEM — R73.9 HYPERGLYCEMIA: Status: ACTIVE | Noted: 2023-02-28

## 2023-02-28 PROBLEM — I49.9 CARDIAC DYSRHYTHMIA: Status: ACTIVE | Noted: 2023-02-28

## 2023-02-28 PROBLEM — E87.6 HYPOKALEMIA: Status: ACTIVE | Noted: 2023-02-28

## 2023-02-28 LAB
ANION GAP SERPL CALC-SCNC: 10 MMOL/L (ref 7–16)
BASOPHILS # BLD AUTO: 0.5 % (ref 0–1.8)
BASOPHILS # BLD: 0.04 K/UL (ref 0–0.12)
BUN SERPL-MCNC: 11 MG/DL (ref 8–22)
CALCIUM SERPL-MCNC: 9.1 MG/DL (ref 8.5–10.5)
CHLORIDE SERPL-SCNC: 107 MMOL/L (ref 96–112)
CO2 SERPL-SCNC: 22 MMOL/L (ref 20–33)
CREAT SERPL-MCNC: 0.72 MG/DL (ref 0.5–1.4)
EOSINOPHIL # BLD AUTO: 0.04 K/UL (ref 0–0.51)
EOSINOPHIL NFR BLD: 0.5 % (ref 0–6.9)
ERYTHROCYTE [DISTWIDTH] IN BLOOD BY AUTOMATED COUNT: 44.1 FL (ref 35.9–50)
EST. AVERAGE GLUCOSE BLD GHB EST-MCNC: 103 MG/DL
GFR SERPLBLD CREATININE-BSD FMLA CKD-EPI: 84 ML/MIN/1.73 M 2
GLUCOSE BLD STRIP.AUTO-MCNC: 101 MG/DL (ref 65–99)
GLUCOSE BLD STRIP.AUTO-MCNC: 107 MG/DL (ref 65–99)
GLUCOSE BLD STRIP.AUTO-MCNC: 111 MG/DL (ref 65–99)
GLUCOSE BLD STRIP.AUTO-MCNC: 134 MG/DL (ref 65–99)
GLUCOSE SERPL-MCNC: 115 MG/DL (ref 65–99)
HBA1C MFR BLD: 5.2 % (ref 4–5.6)
HCT VFR BLD AUTO: 41.6 % (ref 37–47)
HGB BLD-MCNC: 14.1 G/DL (ref 12–16)
IMM GRANULOCYTES # BLD AUTO: 0.05 K/UL (ref 0–0.11)
IMM GRANULOCYTES NFR BLD AUTO: 0.6 % (ref 0–0.9)
LYMPHOCYTES # BLD AUTO: 1.19 K/UL (ref 1–4.8)
LYMPHOCYTES NFR BLD: 14 % (ref 22–41)
MAGNESIUM SERPL-MCNC: 2.3 MG/DL (ref 1.5–2.5)
MCH RBC QN AUTO: 29.7 PG (ref 27–33)
MCHC RBC AUTO-ENTMCNC: 33.9 G/DL (ref 33.6–35)
MCV RBC AUTO: 87.6 FL (ref 81.4–97.8)
MONOCYTES # BLD AUTO: 0.45 K/UL (ref 0–0.85)
MONOCYTES NFR BLD AUTO: 5.3 % (ref 0–13.4)
NEUTROPHILS # BLD AUTO: 6.75 K/UL (ref 2–7.15)
NEUTROPHILS NFR BLD: 79.1 % (ref 44–72)
NRBC # BLD AUTO: 0 K/UL
NRBC BLD-RTO: 0 /100 WBC
PHOSPHATE SERPL-MCNC: 3.2 MG/DL (ref 2.5–4.5)
PLATELET # BLD AUTO: 193 K/UL (ref 164–446)
PMV BLD AUTO: 9.3 FL (ref 9–12.9)
POTASSIUM SERPL-SCNC: 4 MMOL/L (ref 3.6–5.5)
RBC # BLD AUTO: 4.75 M/UL (ref 4.2–5.4)
SODIUM SERPL-SCNC: 139 MMOL/L (ref 135–145)
WBC # BLD AUTO: 8.5 K/UL (ref 4.8–10.8)

## 2023-02-28 PROCEDURE — A9270 NON-COVERED ITEM OR SERVICE: HCPCS

## 2023-02-28 PROCEDURE — 84100 ASSAY OF PHOSPHORUS: CPT

## 2023-02-28 PROCEDURE — 700101 HCHG RX REV CODE 250: Performed by: NURSE PRACTITIONER

## 2023-02-28 PROCEDURE — 97163 PT EVAL HIGH COMPLEX 45 MIN: CPT

## 2023-02-28 PROCEDURE — 97167 OT EVAL HIGH COMPLEX 60 MIN: CPT

## 2023-02-28 PROCEDURE — 82962 GLUCOSE BLOOD TEST: CPT | Mod: 91

## 2023-02-28 PROCEDURE — 80048 BASIC METABOLIC PNL TOTAL CA: CPT

## 2023-02-28 PROCEDURE — 770000 HCHG ROOM/CARE - INTERMEDIATE ICU *

## 2023-02-28 PROCEDURE — 99223 1ST HOSP IP/OBS HIGH 75: CPT | Performed by: HOSPITALIST

## 2023-02-28 PROCEDURE — 700101 HCHG RX REV CODE 250: Performed by: EMERGENCY MEDICINE

## 2023-02-28 PROCEDURE — 700102 HCHG RX REV CODE 250 W/ 637 OVERRIDE(OP): Performed by: HOSPITALIST

## 2023-02-28 PROCEDURE — A9270 NON-COVERED ITEM OR SERVICE: HCPCS | Performed by: HOSPITALIST

## 2023-02-28 PROCEDURE — 85025 COMPLETE CBC W/AUTO DIFF WBC: CPT

## 2023-02-28 PROCEDURE — 700105 HCHG RX REV CODE 258: Performed by: EMERGENCY MEDICINE

## 2023-02-28 PROCEDURE — 99232 SBSQ HOSP IP/OBS MODERATE 35: CPT | Performed by: PSYCHIATRY & NEUROLOGY

## 2023-02-28 PROCEDURE — 700102 HCHG RX REV CODE 250 W/ 637 OVERRIDE(OP)

## 2023-02-28 PROCEDURE — 83036 HEMOGLOBIN GLYCOSYLATED A1C: CPT

## 2023-02-28 PROCEDURE — 83735 ASSAY OF MAGNESIUM: CPT

## 2023-02-28 PROCEDURE — 92610 EVALUATE SWALLOWING FUNCTION: CPT

## 2023-02-28 PROCEDURE — 92612 ENDOSCOPY SWALLOW (FEES) VID: CPT

## 2023-02-28 RX ORDER — LISINOPRIL 10 MG/1
10 TABLET ORAL
Status: DISCONTINUED | OUTPATIENT
Start: 2023-02-28 | End: 2023-03-01

## 2023-02-28 RX ORDER — QUETIAPINE FUMARATE 25 MG/1
25 TABLET, FILM COATED ORAL NIGHTLY
Status: DISCONTINUED | OUTPATIENT
Start: 2023-02-28 | End: 2023-03-04 | Stop reason: HOSPADM

## 2023-02-28 RX ADMIN — LABETALOL HYDROCHLORIDE 10 MG: 5 INJECTION, SOLUTION INTRAVENOUS at 21:58

## 2023-02-28 RX ADMIN — NICARDIPINE HYDROCHLORIDE 2.5 MG/HR: 25 INJECTION, SOLUTION INTRAVENOUS at 07:41

## 2023-02-28 RX ADMIN — LISINOPRIL 10 MG: 10 TABLET ORAL at 17:28

## 2023-02-28 RX ADMIN — QUETIAPINE FUMARATE 25 MG: 25 TABLET ORAL at 23:35

## 2023-02-28 ASSESSMENT — COGNITIVE AND FUNCTIONAL STATUS - GENERAL
STANDING UP FROM CHAIR USING ARMS: A LOT
HELP NEEDED FOR BATHING: TOTAL
MOVING FROM LYING ON BACK TO SITTING ON SIDE OF FLAT BED: A LOT
SUGGESTED CMS G CODE MODIFIER MOBILITY: CL
TURNING FROM BACK TO SIDE WHILE IN FLAT BAD: A LOT
DRESSING REGULAR UPPER BODY CLOTHING: A LOT
WALKING IN HOSPITAL ROOM: TOTAL
DAILY ACTIVITIY SCORE: 9
TOILETING: TOTAL
EATING MEALS: A LOT
CLIMB 3 TO 5 STEPS WITH RAILING: TOTAL
MOBILITY SCORE: 10
PERSONAL GROOMING: A LOT
SUGGESTED CMS G CODE MODIFIER DAILY ACTIVITY: CL
MOVING TO AND FROM BED TO CHAIR: A LOT
DRESSING REGULAR LOWER BODY CLOTHING: TOTAL

## 2023-02-28 ASSESSMENT — PAIN DESCRIPTION - PAIN TYPE
TYPE: ACUTE PAIN

## 2023-02-28 ASSESSMENT — GAIT ASSESSMENTS: GAIT LEVEL OF ASSIST: UNABLE TO PARTICIPATE

## 2023-02-28 NOTE — ASSESSMENT & PLAN NOTE
Left basal ganglia, no surgical intervention indicated  Neurology consulting and refer to the detailed recommendations as per their note  Glycemic control  Blood pressure control  No further blood thinners  PT OT speech therapy  Rehabilitation, likely SNF candidate

## 2023-02-28 NOTE — CONSULTS
Hospital Medicine Consultation    Date of Service  2/28/2023    Referring Physician  Brendon Meyer M.D. intensive care    Consulting Physician  Vincent Mata M.D.    Reason for Consultation  Medicine consultation requested patient admitted with a CVA, hemorrhagic CVA.    History of Presenting Illness  83 y.o. female who presented 2/27/2023 with past med history hypertension, dyslipidemia, on anticoagulation for questionable reasons, possible arrhythmia, fairly advanced Alzheimer's dementia, prior CVA with residual left hand numbness, CKD stage IIIa, admitted on 2/27 report of acute onset of facial numbness, right-sided weakness involving arm and leg, the patient reportedly was at [employer] with family when suddenly she had the onset of right-sided weakness, slumping over to the right in her seat.  Patient appeared to be more altered from her baseline.  The patient usually knows her own name and where she is and cannot identify family members.  On evaluation emergency room the patient is showing a left thalamic intraparenchymal hemorrhage.  Initial NIHSS was 16, ICH score is 1.  The patient was admitted to the ICU level of care with neurology consultation and ICU level treatment.  The patient was monitored closely in the ICU, her blood pressure was controlled for blood pressure systolically less than 140, the patient was followed closely with neurochecks, her anticoagulation was reversed with Kcentra, a follow-up CT head was unchanged, neurosurgery was not involved, not a surgical lesion.  The patient was found with significant right-sided weakness upper and lower extremity, MRI is on order, currently pending.  The patient is on telemetry, in a sinus rhythm  On my reevaluation the patient continues to be confused, poorly responding to verbal cues and commands  She is moving her left side spontaneously, but she does move her right side on command weakly.  Review of Systems  ROS  Unable to obtain    Past Medical  History  Alzheimer's dementia with behavioral disturbance  Hypertension  History of CVA with residual left hand numbness  Prior cataracts  Dyslipidemia  Question coronary disease, prior myocardial infarction  CKD stage IIIa    Surgical History   has a past surgical history that includes cataract phaco with iol (Left, 8/13/2018).  Question of prior left thyroglossal duct cyst removal    Family History  Reviewed with family members, negative and not pertinent to this current admission    Social History   reports that she has never smoked. She has never used smokeless tobacco. She reports that she does not drink alcohol and does not use drugs.    Medications  Prior to Admission Medications   Prescriptions Last Dose Informant Patient Reported? Taking?   QUEtiapine (SEROQUEL) 25 MG Tab UNK at [facility] Patient's Home Pharmacy Yes Yes   Sig: Take 25 mg by mouth every day.   amLODIPine (NORVASC) 2.5 MG Tab UNK at [facility] Patient's Home Pharmacy Yes Yes   Sig: Take 2.5 mg by mouth every day.   fluconazole (DIFLUCAN) 150 MG tablet UNK at [facility] Patient's Home Pharmacy No No   Sig: Take 1 Tablet by mouth every day.   hydrocortisone (ANUSOL-HC) 25 MG Suppos UNK at [facility] Patient's Home Pharmacy No No   Sig: Insert 1 Suppository into the rectum every 12 hours.   hydrocortisone 1 % Cream UNK at [facility] Patient's Home Pharmacy No No   Sig: Apply 1 Application topically 2 times a day.   losartan (COZAAR) 50 MG Tab UNK at [facility] Patient's Home Pharmacy Yes Yes   Sig: Take 50 mg by mouth every day.      Facility-Administered Medications: None       Allergies  No Known Allergies    Physical Exam  Temp:  [36.2 °C (97.1 °F)-36.9 °C (98.5 °F)] 36.2 °C (97.2 °F)  Pulse:  [] 82  Resp:  [11-49] 19  BP: (102-165)/() 131/75  SpO2:  [90 %-100 %] 95 %    Physical Exam  Vitals and nursing note reviewed.   Constitutional:       Appearance: She is well-developed. She is ill-appearing. She is not diaphoretic.      Comments: Awakens easily, disoriented    HENT:      Head: Normocephalic and atraumatic.      Nose: Nose normal.   Eyes:      Conjunctiva/sclera: Conjunctivae normal.      Pupils: Pupils are equal, round, and reactive to light.   Neck:      Thyroid: No thyromegaly.      Vascular: No JVD.   Cardiovascular:      Rate and Rhythm: Normal rate and regular rhythm.      Heart sounds: Normal heart sounds.     No friction rub. No gallop.   Pulmonary:      Effort: Pulmonary effort is normal.      Breath sounds: Normal breath sounds. No wheezing or rales.   Abdominal:      General: Bowel sounds are normal. There is no distension.      Palpations: Abdomen is soft. There is no mass.      Tenderness: There is no abdominal tenderness. There is no guarding or rebound.   Musculoskeletal:         General: No tenderness. Normal range of motion.      Cervical back: Normal range of motion and neck supple.   Lymphadenopathy:      Cervical: No cervical adenopathy.   Skin:     General: Skin is warm and dry.   Neurological:      Mental Status: She is disoriented.      Cranial Nerves: Cranial nerve deficit present.      Sensory: Sensory deficit present.      Motor: Weakness present.      Coordination: Coordination abnormal.      Comments: Left facial droop  Right upper right lower extremity weakness  Right neglect, left preference  Sensory loss left face   Psychiatric:      Comments: Unable to evaluate       Fluids  Date 02/28/23 0700 - 03/01/23 0659   Shift 3195-0460 0307-1346 3196-0752 24 Hour Total   INTAKE   I.V. 565.4   565.4   Shift Total 565.4   565.4   OUTPUT   Shift Total       Weight (kg) 69.4 69.4 69.4 69.4       Laboratory  Recent Labs     02/27/23 0135 02/28/23  0400   WBC 6.6 8.5   RBC 4.53 4.75   HEMOGLOBIN 13.4 14.1   HEMATOCRIT 40.7 41.6   MCV 89.8 87.6   MCH 29.6 29.7   MCHC 32.9* 33.9   RDW 45.2 44.1   PLATELETCT 199 193   MPV 9.3 9.3     Recent Labs     02/27/23 0135 02/28/23  0400   SODIUM 137 139   POTASSIUM 3.3* 4.0   CHLORIDE 108 107   CO2 19* 22    GLUCOSE 86 115*   BUN 21 11   CREATININE 0.77 0.72   CALCIUM 8.1* 9.1     Recent Labs     02/27/23  0135   APTT 38.1*   INR 1.72*          Recent Labs     02/27/23  0135   TRIGLYCERIDE 100   HDL 43   LDL 41        Imaging  CT-HEAD W/O   Final Result      1.  Cerebral atrophy.      2.  White matter lucencies most consistent with small vessel ischemic change versus demyelination or gliosis.      3. Stable 1.5 cm ovoid area of intraparenchymal hemorrhage in the left basal ganglia.      4. Chronic multifocal areas of lacunar type infarction noted in the basal ganglia..      5. Small chronic area of infarction in the right posterior frontal lobe.      DX-CHEST-PORTABLE (1 VIEW)   Final Result      1.  There is no acute cardiopulmonary process.      CT-CTA NECK WITH & W/O-POST PROCESSING   Final Result      1.  Mild bilateral atherosclerosis with less than 50% ICA stenosis.      CT-CTA HEAD WITH & W/O-POST PROCESS   Final Result      1.  CT angiogram of the [place] demonstrate no evidence of large vessel occlusion.      CT-HEAD W/O   Final Result      1.  There is a left thalamic and posterior limb internal capsule focal area of hemorrhage.   2.  There is an old right posterior frontoparietal infarct with encephalomalacia.   3.  There is diffuse atrophy.   4.  Nonspecific white matter disease most consistent with chronic small vessel ischemic change again seen.      A secure VOALTE message was sent and confirmed received to RAMÓN SOUZA on 2/27/2023 at 1:50 AM.         MR-BRAIN-WITH & W/O    (Results Pending)       Assessment/Plan  * Hemorrhagic stroke (HCC)- (present on admission)  Assessment & Plan  Left basal ganglia, no surgical intervention indicated  Neurology consulting and refer to the detailed recommendations as per their note  Glycemic control  Blood pressure control  No further blood thinners  PT OT speech therapy  Rehabilitation    Oropharyngeal dysphagia  Assessment & Plan  Seen by SLP,  cleared for modified diet, monitor closely  Feeding assist    Hyperglycemia  Assessment & Plan  Monitor, recheck hemoglobin A1c    Hypokalemia  Assessment & Plan  S/p replacement, monitor    Cardiac dysrhythmia  Assessment & Plan  Unclear details, the patient previously on Eliquis  Continue telemetry monitoring  Currently not a candidate for anticoagulation  Reversal on admission with Kcentra    Other hyperlipidemia- (present on admission)  Assessment & Plan  Consider statin medication    Alzheimer's dementia with behavioral disturbance (HCC)  Assessment & Plan  Existing, fairly advanced, continue Seroquel every afternoon    Chronic renal failure- (present on admission)  Assessment & Plan  Currently improved, monitor, avoid nephrotoxins    Benign essential hypertension- (present on admission)  Assessment & Plan  History of, titrate medication to systolic less than 140    Plan  Attempts to wean the patient off nicardipine drip  Oral medication as tolerated  Diet as tolerated with modification as per SLP  Tight glycemic control  MRI when available  Neurology follow-up appreciated  The patient will likely need additional care on discharge, question SNF versus acute rehab  See orders  Medically complex high-risk patient    Thank you for consulting with us, we will follow along closely while the patient is hospitalized and hopefully transition the patient to a lower level of care soon      Please note that this dictation was created using voice recognition software. I have made every reasonable attempt to correct obvious errors, but I expect that there are errors of grammar and possibly context that I did not discover before finalizing the note.

## 2023-02-28 NOTE — CARE PLAN
The patient is Stable - Low risk of patient condition declining or worsening    Shift Goals  Clinical Goals: patient will remain vitally stable within ordered parameters  Patient Goals: comfort  Family Goals: safety and comfort    Progress made toward(s) clinical / shift goals:      Problem: Knowledge Deficit - Standard  Goal: Patient and family/care givers will demonstrate understanding of plan of care, disease process/condition, diagnostic tests and medications  Outcome: Progressing  Note: Patient cognition consistently alert to self  Family demonstrating understanding in plan of care     Problem: Skin Integrity  Goal: Skin integrity is maintained or improved  Outcome: Progressing  Note: Patient skin remains free of new injury      Problem: Fall Risk  Goal: Patient will remain free from falls  Outcome: Progressing  Note: Free of falls      Problem: Pain - Standard  Goal: Alleviation of pain or a reduction in pain to the patient’s comfort goal  Outcome: Progressing  Note: Patient reporting minimal pain        Patient is not progressing towards the following goals:

## 2023-02-28 NOTE — PROGRESS NOTES
Neurology Progress Note  Neurohospitalist Service, [facility]    Referring Physician: Vincent Mata M.D.    Chief Complaint   Patient presents with    Possible Stroke     Pt was at [facility] and experienced right sided weakness, dizziness, and slurred speech per patient's granddaughter       HPI: Refer to initial documented Neurology H&P, as detailed in the patient's chart.    Interval History: No acute events overnight.  Right-sided weakness has improved.  She is disoriented which is her baseline with dementia.    Review of systems: In addition to what is detailed in the HPI and/or updated in the interval history, all other systems reviewed and are negative.    Past Medical History:    has a past medical history of Hypertension.    FHx:  family history is not on file.    SHx:   reports that she has never smoked. She has never used smokeless tobacco. She reports that she does not drink alcohol and does not use drugs.    Medications:    Current Facility-Administered Medications:     niCARdipine (CARDENE) 25 mg in  mL Standard Infusion, 0-15 mg/hr, Intravenous, Continuous, Aramis Smith M.D., Last Rate: 25 mL/hr at 02/28/23 0741, 2.5 mg/hr at 02/28/23 0741    Respiratory Therapy Consult, , Nebulization, Continuous RT, Judith Kimball    LORazepam (ATIVAN) injection 2 mg, 2 mg, Intravenous, Q5 MIN PRN, Judith Kimball    ondansetron (ZOFRAN ODT) dispertab 4 mg, 4 mg, Oral, Q4HRS PRN **OR** ondansetron (ZOFRAN) syringe/vial injection 4 mg, 4 mg, Intravenous, Q4HRS PRN, Judith Kimball    labetalol (NORMODYNE/TRANDATE) injection 10 mg, 10 mg, Intravenous, Q4HRS PRN, Judith Kimball    hydrALAZINE (APRESOLINE) injection 10 mg, 10 mg, Intravenous, Q4HRS PRN, Judith Kimball    insulin regular (HumuLIN R,NovoLIN R) injection, 1-6 Units, Subcutaneous, Q6HRS **AND** POC blood glucose manual result, , , Q6H **AND** NOTIFY MD and PharmD, , , Once **AND** Administer 20 grams of glucose  (approximately 8 ounces of fruit juice) every 15 minutes PRN FSBG less than 70 mg/dL, , , PRN **AND** dextrose 10 % BOLUS 25 g, 25 g, Intravenous, Q15 MIN PRN, Judith Kimball    senna-docusate (PERICOLACE or SENOKOT S) 8.6-50 MG per tablet 2 Tablet, 2 Tablet, Oral, BID **AND** polyethylene glycol/lytes (MIRALAX) PACKET 1 Packet, 1 Packet, Oral, QDAY PRN **AND** magnesium hydroxide (MILK OF MAGNESIA) suspension 30 mL, 30 mL, Oral, QDAY PRN **AND** bisacodyl (DULCOLAX) suppository 10 mg, 10 mg, Rectal, QDAY PRN, Judith Kimball    acetaminophen (Tylenol) tablet 650 mg, 650 mg, Oral, Q4HRS PRN **OR** acetaminophen (TYLENOL) suppository 650 mg, 650 mg, Rectal, Q4HRS PRN, Judith Kimball    Physical Examination:    Vitals:    02/28/23 0600 02/28/23 0615 02/28/23 0700 02/28/23 0800   BP: 111/64 110/61 123/59 115/58   Pulse: 67 65 73 71   Resp:    14   Temp:    36.6 °C (97.9 °F)   TempSrc:    Temporal   SpO2: 95% 97% 96% 97%   Weight:       Height:           General:   Patient is awake and in no acute distress  Neck: Full range of motion  Eyes: Midline, Pupils reactive to light.  CV: RRR  Lungs: No respiratory distress  Extremities: No cyanosis, warm, no significant edema.    NEUROLOGICAL EXAM:   Mental status: Awake, alert, but disoriented and somewhat confused.  She is able to follow simple commands in Kazakh.    Speech and language: speech is slightly dysarthric but comprehendible.  The patient is able to name and repeat.  Cranial nerve exam: Pupils are equal, round and reactive to light bilaterally. Visual fields are full. Extraocular muscles are intact. Sensation in the face is decreased to light touch.  She has mild right facial droop.   Motor exam: Sustain antigravity in left upper and lower extremities with no downward drift.  She has antigravity with downward drift in right upper and lower extremity.  Tone is normal. No abnormal movements were seen on exam.  Sensory exam: Sensation is intact to light  touch.  Coordination: no gross ataxia noted on exam  Plantar reflexes: Equivocal  Gait: deferred    Objective Data:    Labs:  Lab Results   Component Value Date/Time    PROTHROMBTM 19.8 (H) 02/27/2023 01:35 AM    INR 1.72 (H) 02/27/2023 01:35 AM      Lab Results   Component Value Date/Time    WBC 8.5 02/28/2023 04:00 AM    RBC 4.75 02/28/2023 04:00 AM    HEMOGLOBIN 14.1 02/28/2023 04:00 AM    HEMATOCRIT 41.6 02/28/2023 04:00 AM    MCV 87.6 02/28/2023 04:00 AM    MCH 29.7 02/28/2023 04:00 AM    MCHC 33.9 02/28/2023 04:00 AM    MPV 9.3 02/28/2023 04:00 AM    NEUTSPOLYS 79.10 (H) 02/28/2023 04:00 AM    LYMPHOCYTES 14.00 (L) 02/28/2023 04:00 AM    MONOCYTES 5.30 02/28/2023 04:00 AM    EOSINOPHILS 0.50 02/28/2023 04:00 AM    BASOPHILS 0.50 02/28/2023 04:00 AM      Lab Results   Component Value Date/Time    SODIUM 139 02/28/2023 04:00 AM    POTASSIUM 4.0 02/28/2023 04:00 AM    CHLORIDE 107 02/28/2023 04:00 AM    CO2 22 02/28/2023 04:00 AM    GLUCOSE 115 (H) 02/28/2023 04:00 AM    BUN 11 02/28/2023 04:00 AM    CREATININE 0.72 02/28/2023 04:00 AM      Lab Results   Component Value Date/Time    CHOLSTRLTOT 104 02/27/2023 01:35 AM    LDL 41 02/27/2023 01:35 AM    HDL 43 02/27/2023 01:35 AM    TRIGLYCERIDE 100 02/27/2023 01:35 AM       Lab Results   Component Value Date/Time    ALKPHOSPHAT 119 (H) 02/27/2023 01:35 AM    ASTSGOT 21 02/27/2023 01:35 AM    ALTSGPT 11 02/27/2023 01:35 AM    TBILIRUBIN 0.4 02/27/2023 01:35 AM        Imaging/Testing:    I interpreted and/or reviewed the patient's neuroimaging    CT-HEAD W/O   Final Result      1.  Cerebral atrophy.      2.  White matter lucencies most consistent with small vessel ischemic change versus demyelination or gliosis.      3. Stable 1.5 cm ovoid area of intraparenchymal hemorrhage in the left basal ganglia.      4. Chronic multifocal areas of lacunar type infarction noted in the basal ganglia..      5. Small chronic area of infarction in the right posterior frontal lobe.       DX-CHEST-PORTABLE (1 VIEW)   Final Result      1.  There is no acute cardiopulmonary process.      CT-CTA NECK WITH & W/O-POST PROCESSING   Final Result      1.  Mild bilateral atherosclerosis with less than 50% ICA stenosis.      CT-CTA HEAD WITH & W/O-POST PROCESS   Final Result      1.  CT angiogram of the [place] demonstrate no evidence of large vessel occlusion.      CT-HEAD W/O   Final Result      1.  There is a left thalamic and posterior limb internal capsule focal area of hemorrhage.   2.  There is an old right posterior frontoparietal infarct with encephalomalacia.   3.  There is diffuse atrophy.   4.  Nonspecific white matter disease most consistent with chronic small vessel ischemic change again seen.      A secure VOALTE message was sent and confirmed received to RAMÓN SOUZA on 2/27/2023 at 1:50 AM.         MR-BRAIN-WITH & W/O    (Results Pending)       Assessment and Plan:  Janine Torrez is a 83 y.o. with history of previous stroke, at presentation on [facility] who was brought to emergency room for acute onset of right-sided weakness and numbness with onset at 1 AM on 2/27/2023 while she was at the [employer].   She has a left thalamic hemorrhage.  Her ICH score is 1.        Plan:  -q2h and PRN neuro assessment. VS per nursing/unit protocol.   -Maintain systolic blood pressure less than 140, antihypertensives per primary team.  Prefer Cardene drip  -On Eliquis at presentation, reversed with Kcentra  -Obtain MRI Brain w/wo contrast, pending.  -Telemetry; currently SR.   -Head of bed elevated at 30 degrees.  - Maintain bowel regimen to avoid Valsalva maneuver and increased intracranial pressure.  -Maintain normoglycemia, normothermia and eunatremia  - Avoid antiplatelet and anticoagulant.  - Her hemorrhage is small with ICH score of 1, does not need surgical intervention at this point.  - Repeat head CT without contrast on 2/27/2023 at 8: 11 AM stable  -Recommend aggressive  BG management per primary team. Avoid IVF with Dextrose. -BG goal . Check hemoglobin A1c.  Goal < 7  -PT/OT/SLP eval and treat for early mobilization if blood pressure is stable.  -All other medical management per primary team.   -DVT PPX: SCDs.      The plan of care above has been discussed with Dr. Meyer in ICU.       Please note that this dictation was created using voice recognition software. I have made every reasonable attempt to correct obvious errors, but I expect that there are errors of grammar and possibly content that I did not discover before finalizing the note.        Molly Galloway MD  Acute Care Neurology Services

## 2023-02-28 NOTE — THERAPY
"Speech Language Pathology   Fiberoptic Endoscopic Evaluation of Swallow     Patient Name: Janine Torrez  AGE:  83 y.o., SEX:  female  Medical Record #: [id number]  Today's Date: 2/28/2023     Precautions  Precautions: Fall Risk, Swallow Precautions  Comments: R inattention    HPI:  82 y/o admitted on 2/27 for possible stroke. Not seen by SLP before at [facility].     CT of head on 2/27: \"Stable 1.5 cm ovoid area of intraparenchymal hemorrhage in the left basal ganglia. Chronic multifocal areas of lacunar type infarction noted in the basal ganglia. Small chronic area of infarction in the right posterior frontal lobe.\"     CTA of head on 2/27: \" CT angiogram of the [place] demonstrate no evidence of large vessel occlusion.\"     Dx chest on 2/27: \"There is no acute cardiopulmonary process.\"        Current Method of Nutrition   NPO until cleared by speech pathology      Pertinent Information:  Affect/Behavior: Appropriate  Oxygen Requirements: Room Air  Feeding Tube: None  Dentition: Good  Factor(s) Affecting Performance: None    Discussed the risks, benefits, and alternatives of the FEES procedure. Patient/family acknowledged and agreed to proceed.     Assessment  Flexible Endoscopic Evaluation of Swallowing (FEES) completed at bedside today. The endoscope was passed transnasally via right nare to evaluate the anatomy and physiology of swallowing. Pt tolerated the procedure with no apparent distress.    Anatomic Findings: Unremarkable  Vocal Fold Motion: Bilateral movement  Secretion Management: Excess secretions prior to PO trials  PO trials: thin liquids, mildly thick liquids, liquidized, soft & bite sized, regular solids      Consistency PAS Score Timing Comments   Thin Liquid 6 Pre swallow    Mildly Thick Liquid 1 N/A    Liquidized 3 Post swallow    Puree 1 N/A    Soft & Bite Sized 2 During swallow    Regular 1 N/A    Mixed Consistency 1 N/A      Penetration-Aspiration Scale (PAS)  1     No contrast " enters airway  2     Contrast enters the airway, remains above the vocal folds, and is ejected from the airway (not seen in the airway at the end of the swallow).  3     Contrast enters the airway, remains above the vocal folds, and is not ejected from the airway (is seen in the airway after the swallow).  4     Contrast enters the airway, contacts the vocal folds, and is ejected from the airway.  5     Contrast enters the airway, contacts the vocal folds, and is not ejected from the airway  6     Contrast enters the airway, crosses the plane of the vocal folds, and is ejected from the airway.  7     Contrast enters the airway, crosses the plane of the vocal folds, and is not ejected from the airway despite effort.  8     Contrast enters the airway, crosses the plane of the vocal folds, is not ejected from the airway and there is no response to aspiration.        Oral phase:  Prolonged mastication of solid textures. Piecemeal deglutition noted through out trials of thin liquids, apple sauce, fruit, and solids.    Deficits marked by: prolonged mastication and reduced base of tongue retraction.      Pharyngeal phase:    Thin liquids: Aspiration x1 with straw sip, but pt able to eject out of airway. Consistent penetration events before and during the swallow, however, residue remained in laryngeal vestibule. Trace vallecular and pyriform sinus residue after the swallow.    MTL: No penetration or aspiration appreciated with cup or straw sips.    Apple sauce: High penetration x1 noted after the swallow on the inferior epiglottis after the swallow.    Soft solids/mixed: Penetration x1 with trials of solids but is ejected from the airway. No penetration/aspiration with mixed consistencies. Piece of soft solid remained in L vallecular space after the swallow which cleared with additional bolus trial. Poor sensation to residue.    Solids: Increase in pharyngeal residue as trials progressed with decreased sensation. No  "penetration or aspiration appreciated.    Pt with impaired airway protection with trials of thin liquids, but adequate airway protection with MTL, liquidized, puree, soft solids/mixed, and solids.       Deficits marked by: laryngeal mistiming, impaired laryngeal vestibular closure, and impaired sensation.      Compensatory Strategies:    Secondary swallows: improved pharyngeal residue        Clinical Impressions  The pt presents with a mild-moderate oropharyngeal dysphagia, likely acute related to acute CVA.        Recommendations  Recommend pt begin a soft and bite size/MTL with thin water between meals.   2.  Swallowing Instructions & Precautions:   Supervision: Assist with meal tray set up, Direct supervision during meals  Positioning: Fully upright and midline during oral intake  Medication: Whole with liquid  Strategies: Small bites/sips  Oral Care: Q2h        Plan    Recommend Speech Therapy 5 times per week until therapy goals are met for the following treatments:  Dysphagia Training and Patient / Family / Caregiver Education.    Discharge Recommendations: Recommend post-acute placement for additional speech therapy services prior to discharge home       Objective       02/28/23 1503   Vitals   O2 (LPM) 0   O2 Delivery Device None - Room Air   Pain 0 - 10 Group   Therapist Pain Assessment Post Activity Pain Same as Prior to Activity;Nurse Notified;0   Prior Living Situation   Lives with - Patient's Self Care Capacity Adult Children;Child Less than 18 Years of Age   Prior Level Of Function   Communication Unknown   Swallow Unknown   Dentition Intact   Hearing Within Functional Limits for Evaluation   Patient's Primary Language English   Patient / Family Goals   Patient / Family Goal #1 when asked if she wanted more water she said \"no\"   Goal #1 Outcome Progressing as expected   Short Term Goals   Short Term Goal # 1 Pt will participate in a diagnostic swallow study to further assess swallow function.   Goal " Outcome # 1 Goal met, new goal added   Short Term Goal # 1 B  Pt will consume an SB6/MT2 diet with no overt s/sx of aspiration   Education Group   Education Provided Dysphagia   Dysphagia Patient Response Patient;Family;Acceptance;Explanation;Verbal Demonstration;Reinforcement Needed   Anticipated Discharge Needs   Discharge Recommendations Recommend post-acute placement for additional speech therapy services prior to discharge home   Therapy Recommendations Upon DC Dysphagia Training;Community Re-Integration;Patient / Family / Caregiver Education   Interdisciplinary Plan of Care Collaboration   IDT Collaboration with  Nursing;Family / Caregiver   Patient Position at End of Therapy Seated;In Bed;Call Light within Reach;Tray Table within Reach;Phone within Reach

## 2023-02-28 NOTE — THERAPY
"Speech Language Pathology   Clinical Swallow Evaluation     Patient Name: Janine Torrez  AGE:  83 y.o., SEX:  female  Medical Record #: [id number]  Today's Date: 2/28/2023     Precautions  Precautions: (P) Swallow Precautions    HPI:  84 y/o admitted on 2/27 for possible stroke. Not seen by SLP before at [facility].    CT of head on 2/27: \"Stable 1.5 cm ovoid area of intraparenchymal hemorrhage in the left basal ganglia. Chronic multifocal areas of lacunar type infarction noted in the basal ganglia. Small chronic area of infarction in the right posterior frontal lobe.\"    CTA of head on 2/27: \" CT angiogram of the [place] demonstrate no evidence of large vessel occlusion.\"    Dx chest on 2/27: \"There is no acute cardiopulmonary process.\"    PMHx:  HTN, HLD, alzhemier's dementia, prior CVA  with residual L hand numbness, CKD      Level of Consciousness: Drowsy  Affect/Behavior: Appropriate  Follows Directives: Inconsistent  Orientation: Self  Hearing: Functional hearing  Vision: Functional vision      Prior Living Situation & Level of Function:   used for session (Elida #[id number]). Pt endorsed she lives with daughter at baseline. Pt not a reliable historian and no family at bedside currently.      Oral Mechanism Evaluation  Facial Symmetry:  R facial weakness  Labial Observations: WFL  Lingual Observations: Midline  Dentition: Good  Comments: upon palpation, large/round protuberance on anterior neck, RN aware      Voice  Quality: WFL  Resonance: WFL  Intensity: Soft  Comments:      Current Method of Nutrition   NPO until cleared by speech pathology       Assessment  Positioning: Pastrana's (60-90 degrees)  Bolus Administration: SLP and Patient  Oxygen Requirements: Room Air  Factor(s) Affecting Performance: Impaired endurance    Swallowing Trials  Ice: WFL  Thin Liquid (TN0): WFL  Liquidised (LQ3): Impaired    Comments:  Anterior loss of bolus noted with trials of ice chips and oral residue " "remained in cavity after the swallow following bites of apple sauce. Suspect delayed AP propulsion with trials of ice chips and thin liquids. Minimal PO trials provided given pt participation.        Clinical Impressions  Pt presents with intermittent s/sx of dysphagia with minimal PO trials and given findings of acute CVA, recommend a FEES to objectively assess swallow function. Recommend NPO pending FEES.       Recommendations  1.  Recommend NPO pending FEES, defer medications to MD  2.  Instrumentation: FEES  3.  Swallowing Instructions & Precautions:   Medication: Non Oral   Oral Care: Q2h        Plan    Recommend Speech Therapy 5 times per week until therapy goals are met for the following treatments:  Dysphagia Training and Patient / Family / Caregiver Education.    Discharge Recommendations: (P) Recommend post-acute placement for additional speech therapy services prior to discharge home       Objective       02/28/23 0909   Initial Contact Note    Initial Contact Note  Order Received and Verified, Speech Therapy Evaluation in Progress with Full Report to Follow.   Precautions   Precautions Swallow Precautions   Vitals   O2 (LPM) 0   O2 Delivery Device None - Room Air   Prior Living Situation   Lives with - Patient's Self Care Capacity Adult Children   Prior Level Of Function   Communication Unknown   Swallow Unknown   Dentition Intact   Hearing Within Functional Limits for Evaluation   Patient's Primary Language Guatemalan   Patient / Family Goals   Patient / Family Goal #1 when asked if she wanted more water she said \"no\"   Short Term Goals   Short Term Goal # 1 Pt will participate in a diagnostic swallow study to further assess swallow function.   Education Group   Education Provided Dysphagia   Dysphagia Patient Response Patient;Acceptance;Explanation;Verbal Demonstration;Reinforcement Needed   Anticipated Discharge Needs   Discharge Recommendations Recommend post-acute placement for additional speech " therapy services prior to discharge home   Therapy Recommendations Upon DC Dysphagia Training;Community Re-Integration;Patient / Family / Caregiver Education   Interdisciplinary Plan of Care Collaboration   IDT Collaboration with  Nursing   Patient Position at End of Therapy Seated;In Bed

## 2023-02-28 NOTE — CARE PLAN
The patient is Watcher - Medium risk of patient condition declining or worsening    Shift Goals  Clinical Goals: maintain b/p 100-140  Patient Goals: rest  Family Goals: flores    Progress made toward(s) clinical / shift goals:      Patient is not progressing towards the following goals:      Problem: Skin Integrity  Goal: Skin integrity is maintained or improved  Note: Q2 turns in place. Skin assessed q shift and as needed.      Problem: Pain - Standard  Goal: Alleviation of pain or a reduction in pain to the patient’s comfort goal  Note: No complaints of pain per patient.

## 2023-02-28 NOTE — THERAPY
"Occupational Therapy   Initial Evaluation     Patient Name: Janine Torrez  Age:  83 y.o., Sex:  female  Medical Record #: [id number]  Today's Date: 2/28/2023     Precautions: Fall Risk, Swallow Precautions    Assessment    Patient is 83 y.o. female with h/o dementia, CVA, HTN, admitted with acute onset R-sided weakness, R facial droop. Work-up revealed L thalamic CVA. Pt seen for OT eval. See grid below for details. Pt alert, able to follow simple 1-step commands. Pt demos variable strength to RUE.  and biceps appear intact, however limited functional use (appears due to R inattention). EOB pt demos R lateral lean. Stood once with 2-person assist then reported she felt dizzy and felt \"bad\". Assisted back to supine. /57. Unable to accurately determine PLOF (no family present), but pt is likely below functional baseline. Will benefit from ongoing acute OT. Will follow.     Plan    Occupational Therapy Initial Treatment Plan   Treatment Interventions: Self Care / Activities of Daily Living, Therapeutic Activity, Therapeutic Exercises, Neuro Re-Education / Balance, Sensory Integration Techniques, Cognitive Skill Development, Adaptive Equipment, Manual Therapy Techniques  Treatment Frequency: 3 Times per Week  Duration: Until Therapy Goals Met    DC Equipment Recommendations: Unable to determine at this time  Discharge Recommendations: Recommend post-acute placement for additional occupational therapy services prior to discharge home     Subjective    \"I can't remember.\" (when asked reason for admit)     Objective       02/28/23 1014   Prior Living Situation   Prior Services Unable To Determine At This Time   Housing / Facility 1 Story Apartment / Condo   Steps Into Home 0   Steps In Home 0   Bathroom Set up Bathtub / Shower Combination   Equipment Owned Unable to Determine At This Time   Comments Pt is questionable historian. States she lives with daughter, PENG, school-aged grandsons. Need to confirm " above details.   Prior Level of ADL Function   Comments Pt is questionable historian; states she was independent with BADL PTA. Need to confirm PLOF re: ADL   Prior Level of IADL Function   Comments Pt is questionable historian; states she mobilized without AD PTA. Need to confirm PLOF re: I-ADL   Cognition    Orientation Level Not Oriented to Reason;Not Oriented to Time;Not Oriented to Day;Not Oriented to Year;Not Oriented to Month   Level of Consciousness Alert   Ability To Follow Commands 1 Step   New Learning Impaired   Attention Impaired   Comments Pt awake, following 1-step commands, but disoriented with memory impairment; h/o dementia   Passive ROM Upper Body   Passive ROM Upper Body WDL   Active ROM Upper Body   Dominant Hand Right   Comments LUE WNL; RUE limited by inattention   Strength Upper Body   Comments LUE WNL; RUE variable, appears limited by attention; demonstrated 4+/5  and biceps, 3/5 triceps, 2-/5 shoulder   Sensation Upper Body   Comments LUE intact, RUE able to detect light touch, but unable to localize   Coordination Upper Body   Comments Moderate impairment RUE, appears due to inattention; able to wipe mouth using wash cloth in R hand, but unable to maintain R hand on FWW    Balance Assessment   Sitting Balance (Static) Poor   Sitting Balance (Dynamic) Trace +   Standing Balance (Static) Trace +   Standing Balance (Dynamic) Dependent   Weight Shift Sitting Poor   Weight Shift Standing Absent   Comments brief stand with 2-person assist and FWW   Bed Mobility    Supine to Sit Maximal Assist   Sit to Supine Total Assist   Scooting Total Assist  (seated)   ADL Assessment   Grooming Total Assist  (wiped mouth with cloth in R hand prior to mobilization; once BTB unable to engage in face wipe with either hand (appeared very fatigued))   Lower Body Dressing Total Assist  (don B socks)   Toileting   (NT; PureWick, no BM)   Functional Mobility   Sit to Stand Maximal Assist   Bed, Chair,  Wheelchair Transfer Unable to Participate   Toilet Transfers Unable to Participate   Visual Perception   Visual Perception  Not Tested   Comments due to dizziness EOB and need to return to supine; pt tracking and making eye contact, but with L gaze preference   Activity Tolerance   Sitting in Chair NT, unable   Sitting Edge of Bed < 5 min   Standing 15 seconds   Comments EOB limited by dizziness, feeling poorly   Short Term Goals   Short Term Goal # 1 Pt will complete ADL transfers with min A   Short Term Goal # 2 Pt will complete gown change with SBA using moose technique   Short Term Goal # 3 Pt will incorporate RUE into bimanual tasks as functional assist with no min cues (verbal or tactile)

## 2023-02-28 NOTE — THERAPY
Physical Therapy   Initial Evaluation     Patient Name: Janine Torrez  Age:  83 y.o., Sex:  female  Medical Record #: [id number]  Today's Date: 2/28/2023     Precautions  Precautions: Fall Risk;Swallow Precautions  Comments: R inattention    Assessment  Patient is 83 y.o. female with past medical history of prior stroke with residual left hand numbness, dementia, cardiac arrhythmias on Eliquis;  who presented on 2/27/2023  1:32 AM with right-sided weakness involving the face, arm and leg.  CT head found left thalamic intraparenchymal hemorrhage, non surgical. Additional factors influencing patient status / progress : today, pt is limited by c/o dizzy in sitting EOB, needing max assist to come to EOB, shows heavy fall/lean to right, max assist to attempt standing with R knee manually blocked due to weakness. Pt was unable to attempt ambulation. Pt unable to maintain contact with FWW with Right hand despite good strength testing, significant R inattention. PT to follow to address problems noted below.    Plan    Physical Therapy Initial Treatment Plan   Treatment Plan : Bed Mobility, Gait Training, Neuro Re-Education / Balance, Therapeutic Activities, Therapeutic Exercise, Self Care / Home Evaluation  Treatment Frequency: 3 Times per Week  Duration: Until Therapy Goals Met    DC Equipment Recommendations: Unable to determine at this time  Discharge Recommendations: Recommend post-acute placement for additional physical therapy services prior to discharge home          Objective       02/28/23 1009   Charge Group   PT Evaluation PT Evaluation High   Total Time Spent   PT Total Time Yes   PT Evaluation Time Spent (Mins) 30   PT Total Time Spent (Calculated) 30    Services   Is patient using  services for this encounter? Yes   Language Interpreted Slovak    Name Parul    Mode Inhouse    Precautions   Precautions Fall Risk;Swallow Precautions   Comments R  "inattention   Vitals   Blood Pressure  118/57  (while sitting EOB)   O2 (LPM) 1   O2 Delivery Device Silicone Nasal Cannula   Pain 0 - 10 Group   Therapist Pain Assessment 0;During Activity;Nurse Notified   Prior Living Situation   Prior Services Unable To Determine At This Time   Housing / Facility 1 Story Apartment / Condo   Steps Into Home 0   Steps In Home 0   Equipment Owned None   Lives with - Patient's Self Care Capacity Adult Children  (daughter, PENG, gr kids)   Comments Need to verify as chart indicates h/o dementia   Prior Level of Functional Mobility   Bed Mobility Unable To Determine At This Time   Ambulation Independent   Assistive Devices Used None   Comments Pt states that she is normally up walking with no AD needed.   Cognition    Cognition / Consciousness X   Orientation Level   (states that she can't remember why she is here.)   Level of Consciousness Alert   Ability To Follow Commands 1 Step   New Learning Impaired   Attention Impaired   Passive ROM Lower Body   Passive ROM Lower Body X   Comments stiff ankles B, barely reach neutral B ankles   Active ROM Lower Body    Active ROM Lower Body  X   Comments R LE limited by weakness, L LE appears wfl, though ankle reaches only neutral.   Strength Lower Body   Lower Body Strength  X   Comments R LE grossly 2/5, L LE appears wfl.   Balance Assessment   Sitting Balance (Static) Poor   Sitting Balance (Dynamic) Trace +   Standing Balance (Static) Trace +   Standing Balance (Dynamic) Dependent   Weight Shift Sitting Poor   Weight Shift Standing Absent   Comments standing with FWW and 2 person assist.   Bed Mobility    Supine to Sit Maximal Assist   Sit to Supine Total Assist   Scooting Total Assist   Rolling Moderate Assist to Rt.   Comments at EOB, pt c/o \"dizzy\", BP check=118/57, pt assisted back to bed.   Gait Analysis   Gait Level Of Assist Unable to Participate   Functional Mobility   Sit to Stand Maximal Assist   Bed, Chair, Wheelchair Transfer " Unable to Participate   Comments needs R knee blocked during sit to stand, R hand unable to maintain contact with FWW, R inattention.   How much difficulty does the patient currently have...   Turning over in bed (including adjusting bedclothes, sheets and blankets)? 2   Sitting down on and standing up from a chair with arms (e.g., wheelchair, bedside commode, etc.) 2   Moving from lying on back to sitting on the side of the bed? 2   How much help from another person does the patient currently need...   Moving to and from a bed to a chair (including a wheelchair)? 2   Need to walk in a hospital room? 1   Climbing 3-5 steps with a railing? 1   6 clicks Mobility Score 10   Activity Tolerance   Sitting Edge of Bed 5 min   Short Term Goals    Short Term Goal # 1 Pt will perform bed mobility with supervision in 6 visits   Short Term Goal # 2 Pt will maintain sitting EOB, feet on floor, self supported x 3 min in 6 visits to improve functional indep.   Short Term Goal # 3 Pt will transfer bed to chair with min A in 6 visits to improve functiona lindep.   Short Term Goal # 4 Pt will ambulate x 20 feet using SPC/hemiwalker with mod A in 6 visits to improve functional indep.   Education Group   Education Provided Role of Physical Therapist   Role of Physical Therapist Patient Response Patient;Acceptance;Explanation;Reinforcement Needed   Physical Therapy Initial Treatment Plan    Treatment Plan  Bed Mobility;Gait Training;Neuro Re-Education / Balance;Therapeutic Activities;Therapeutic Exercise;Self Care / Home Evaluation   Treatment Frequency 3 Times per Week   Duration Until Therapy Goals Met   Problem List    Problems Impaired Bed Mobility;Impaired Transfers;Impaired Ambulation;Functional Strength Deficit;Impaired Balance;Decreased Activity Tolerance;Motor Planning / Sequencing   Anticipated Discharge Equipment and Recommendations   DC Equipment Recommendations Unable to determine at this time   Discharge Recommendations  Recommend post-acute placement for additional physical therapy services prior to discharge home   Interdisciplinary Plan of Care Collaboration   IDT Collaboration with  Nursing   Patient Position at End of Therapy In Bed;Call Light within Reach;Tray Table within Reach;Bed Alarm On   Collaboration Comments nsg updated   Session Information   Date / Session Number  2/28-1 (1/3, 3/6)

## 2023-02-28 NOTE — ASSESSMENT & PLAN NOTE
Unclear details, the patient previously on Eliquis  Continue telemetry monitoring  Currently not a candidate for anticoagulation  Reversal on admission with Kcentra  Discontinue eliquis on discharge as unclear indication

## 2023-03-01 LAB
ANION GAP SERPL CALC-SCNC: 8 MMOL/L (ref 7–16)
BASOPHILS # BLD AUTO: 0.2 % (ref 0–1.8)
BASOPHILS # BLD: 0.02 K/UL (ref 0–0.12)
BUN SERPL-MCNC: 15 MG/DL (ref 8–22)
CALCIUM SERPL-MCNC: 9.8 MG/DL (ref 8.5–10.5)
CHLORIDE SERPL-SCNC: 107 MMOL/L (ref 96–112)
CO2 SERPL-SCNC: 23 MMOL/L (ref 20–33)
CREAT SERPL-MCNC: 1.25 MG/DL (ref 0.5–1.4)
EOSINOPHIL # BLD AUTO: 0.05 K/UL (ref 0–0.51)
EOSINOPHIL NFR BLD: 0.6 % (ref 0–6.9)
ERYTHROCYTE [DISTWIDTH] IN BLOOD BY AUTOMATED COUNT: 45.5 FL (ref 35.9–50)
GFR SERPLBLD CREATININE-BSD FMLA CKD-EPI: 43 ML/MIN/1.73 M 2
GLUCOSE BLD STRIP.AUTO-MCNC: 135 MG/DL (ref 65–99)
GLUCOSE SERPL-MCNC: 117 MG/DL (ref 65–99)
HCT VFR BLD AUTO: 43.6 % (ref 37–47)
HGB BLD-MCNC: 14.4 G/DL (ref 12–16)
IMM GRANULOCYTES # BLD AUTO: 0.03 K/UL (ref 0–0.11)
IMM GRANULOCYTES NFR BLD AUTO: 0.3 % (ref 0–0.9)
LYMPHOCYTES # BLD AUTO: 1.42 K/UL (ref 1–4.8)
LYMPHOCYTES NFR BLD: 16.4 % (ref 22–41)
MAGNESIUM SERPL-MCNC: 2.1 MG/DL (ref 1.5–2.5)
MCH RBC QN AUTO: 29.5 PG (ref 27–33)
MCHC RBC AUTO-ENTMCNC: 33 G/DL (ref 33.6–35)
MCV RBC AUTO: 89.3 FL (ref 81.4–97.8)
MONOCYTES # BLD AUTO: 0.68 K/UL (ref 0–0.85)
MONOCYTES NFR BLD AUTO: 7.8 % (ref 0–13.4)
NEUTROPHILS # BLD AUTO: 6.48 K/UL (ref 2–7.15)
NEUTROPHILS NFR BLD: 74.7 % (ref 44–72)
NRBC # BLD AUTO: 0 K/UL
NRBC BLD-RTO: 0 /100 WBC
PHOSPHATE SERPL-MCNC: 3.8 MG/DL (ref 2.5–4.5)
PLATELET # BLD AUTO: 166 K/UL (ref 164–446)
PMV BLD AUTO: 8.9 FL (ref 9–12.9)
POTASSIUM SERPL-SCNC: 4.1 MMOL/L (ref 3.6–5.5)
RBC # BLD AUTO: 4.88 M/UL (ref 4.2–5.4)
SODIUM SERPL-SCNC: 138 MMOL/L (ref 135–145)
WBC # BLD AUTO: 8.7 K/UL (ref 4.8–10.8)

## 2023-03-01 PROCEDURE — 770020 HCHG ROOM/CARE - TELE (206)

## 2023-03-01 PROCEDURE — A9270 NON-COVERED ITEM OR SERVICE: HCPCS

## 2023-03-01 PROCEDURE — 700105 HCHG RX REV CODE 258

## 2023-03-01 PROCEDURE — 99233 SBSQ HOSP IP/OBS HIGH 50: CPT | Performed by: HOSPITALIST

## 2023-03-01 PROCEDURE — 700102 HCHG RX REV CODE 250 W/ 637 OVERRIDE(OP): Performed by: NURSE PRACTITIONER

## 2023-03-01 PROCEDURE — 92526 ORAL FUNCTION THERAPY: CPT

## 2023-03-01 PROCEDURE — 84100 ASSAY OF PHOSPHORUS: CPT

## 2023-03-01 PROCEDURE — 85025 COMPLETE CBC W/AUTO DIFF WBC: CPT

## 2023-03-01 PROCEDURE — 80048 BASIC METABOLIC PNL TOTAL CA: CPT

## 2023-03-01 PROCEDURE — 700111 HCHG RX REV CODE 636 W/ 250 OVERRIDE (IP): Performed by: NURSE PRACTITIONER

## 2023-03-01 PROCEDURE — A9270 NON-COVERED ITEM OR SERVICE: HCPCS | Performed by: NURSE PRACTITIONER

## 2023-03-01 PROCEDURE — 83735 ASSAY OF MAGNESIUM: CPT

## 2023-03-01 PROCEDURE — 82962 GLUCOSE BLOOD TEST: CPT

## 2023-03-01 PROCEDURE — 700102 HCHG RX REV CODE 250 W/ 637 OVERRIDE(OP)

## 2023-03-01 PROCEDURE — 99231 SBSQ HOSP IP/OBS SF/LOW 25: CPT | Performed by: PSYCHIATRY & NEUROLOGY

## 2023-03-01 RX ORDER — SODIUM CHLORIDE, SODIUM LACTATE, POTASSIUM CHLORIDE, AND CALCIUM CHLORIDE .6; .31; .03; .02 G/100ML; G/100ML; G/100ML; G/100ML
500 INJECTION, SOLUTION INTRAVENOUS ONCE
Status: COMPLETED | OUTPATIENT
Start: 2023-03-01 | End: 2023-03-01

## 2023-03-01 RX ORDER — LISINOPRIL 5 MG/1
5 TABLET ORAL
Status: DISCONTINUED | OUTPATIENT
Start: 2023-03-02 | End: 2023-03-02

## 2023-03-01 RX ADMIN — SENNOSIDES AND DOCUSATE SODIUM 2 TABLET: 50; 8.6 TABLET ORAL at 17:01

## 2023-03-01 RX ADMIN — ACETAMINOPHEN 650 MG: 325 TABLET, FILM COATED ORAL at 20:37

## 2023-03-01 RX ADMIN — SODIUM CHLORIDE, POTASSIUM CHLORIDE, SODIUM LACTATE AND CALCIUM CHLORIDE 500 ML: 600; 310; 30; 20 INJECTION, SOLUTION INTRAVENOUS at 22:21

## 2023-03-01 RX ADMIN — HYDRALAZINE HYDROCHLORIDE 10 MG: 20 INJECTION INTRAMUSCULAR; INTRAVENOUS at 19:12

## 2023-03-01 RX ADMIN — QUETIAPINE FUMARATE 25 MG: 25 TABLET ORAL at 20:33

## 2023-03-01 ASSESSMENT — PAIN DESCRIPTION - PAIN TYPE
TYPE: ACUTE PAIN

## 2023-03-01 ASSESSMENT — FIBROSIS 4 INDEX: FIB4 SCORE: 3.09

## 2023-03-01 NOTE — PROGRESS NOTES
[facility] Medicine Daily Progress Note    Date of Service  3/1/2023    Chief Complaint  Janine Torrez is a 83 y.o. female admitted 2/27/2023 with hemorrhagic CVA.    Hospital Course  83 y.o. female who presented 2/27/2023 with past med history hypertension, dyslipidemia, on anticoagulation for questionable reasons, possible arrhythmia, fairly advanced Alzheimer's dementia, prior CVA with residual left hand numbness, CKD stage IIIa, admitted on 2/27 report of acute onset of facial numbness, right-sided weakness involving arm and leg, the patient reportedly was at [employer] with family when suddenly she had the onset of right-sided weakness, slumping over to the right in her seat.  Patient appeared to be more altered from her baseline.  The patient usually knows her own name and where she is and cannot identify family members.  On evaluation emergency room the patient is showing a left thalamic intraparenchymal hemorrhage.  Initial NIHSS was 16, ICH score is 1.  The patient was admitted to the ICU level of care with neurology consultation and ICU level treatment.  The patient was monitored closely in the ICU, her blood pressure was controlled for blood pressure systolically less than 140, the patient was followed closely with neurochecks, her anticoagulation was reversed with Kcentra, a follow-up CT head was unchanged, neurosurgery was not involved, not a surgical lesion.  The patient was found with significant right-sided weakness upper and lower extremity, MRI is on order, currently pending.  The patient is on telemetry, in a sinus rhythm  On my reevaluation the patient continues to be confused, poorly responding to verbal cues and commands  She is moving her left side spontaneously, but she does move her right side on command weakly.    Interval Problem Update  Patient seen and examined today.  Data, Medication data reviewed.  Case discussed with nursing as available.  Plan of Care reviewed with patient and  notified of changes.   3/1 the patient remains very lethargic, she moves her left side fairly spontaneously, less on the right, afebrile, heart rate in the 80s, respiration 16, the patient is on 2 L nasal cannula oxygen, blood pressure with improved control, currently 109/64, laboratory data is reviewed, neurology follow-up noted, no further need for Cardene drip, okay to transfer to neuro with telemetry.  MRI pending.  Ongoing therapies, the patient is cleared for a diet  I have discussed this patient's plan of care and discharge plan at IDT rounds today with Case Management, Nursing, Nursing leadership, and other members of the IDT team.    Consultants/Specialty  critical care and neurology    Code Status  Full Code    Disposition  Patient is not medically cleared for discharge.   Anticipate discharge to to skilled nursing facility.  I have placed the appropriate orders for post-discharge needs.    Review of Systems  Review of Systems   Unable to perform ROS: Dementia      Physical Exam  Temp:  [36.1 °C (97 °F)-36.8 °C (98.2 °F)] 36.1 °C (97 °F)  Pulse:  [] 67  Resp:  [11-35] 12  BP: ()/() 98/66  SpO2:  [90 %-98 %] 98 %    Physical Exam  Vitals and nursing note reviewed.   Constitutional:       Appearance: She is well-developed. She is not diaphoretic.      Comments: Older female, lethargic, left gaze preference   HENT:      Head: Normocephalic and atraumatic.      Nose: Nose normal.   Eyes:      Conjunctiva/sclera: Conjunctivae normal.      Pupils: Pupils are equal, round, and reactive to light.   Neck:      Thyroid: No thyromegaly.      Vascular: No JVD.   Cardiovascular:      Rate and Rhythm: Normal rate and regular rhythm.      Heart sounds: Normal heart sounds.     No friction rub. No gallop.   Pulmonary:      Effort: Pulmonary effort is normal.      Breath sounds: Normal breath sounds. No wheezing or rales.   Abdominal:      General: Bowel sounds are normal. There is no distension.       Palpations: Abdomen is soft. There is no mass.      Tenderness: There is no abdominal tenderness. There is no guarding or rebound.   Musculoskeletal:         General: No tenderness. Normal range of motion.      Cervical back: Normal range of motion and neck supple.   Lymphadenopathy:      Cervical: No cervical adenopathy.   Skin:     General: Skin is warm and dry.   Neurological:      Cranial Nerves: No cranial nerve deficit.      Comments: Left facial droop  Right upper right lower extremity weakness  Right neglect, left preference  Sensory loss left face        Fluids    Intake/Output Summary (Last 24 hours) at 3/1/2023 0749  Last data filed at 3/1/2023 0600  Gross per 24 hour   Intake 745.4 ml   Output 700 ml   Net 45.4 ml       Laboratory  Recent Labs     02/27/23  0135 02/28/23  0400 03/01/23  0609   WBC 6.6 8.5 8.7   RBC 4.53 4.75 4.88   HEMOGLOBIN 13.4 14.1 14.4   HEMATOCRIT 40.7 41.6 43.6   MCV 89.8 87.6 89.3   MCH 29.6 29.7 29.5   MCHC 32.9* 33.9 33.0*   RDW 45.2 44.1 45.5   PLATELETCT 199 193 166   MPV 9.3 9.3 8.9*     Recent Labs     02/27/23 0135 02/28/23  0400 03/01/23  0609   SODIUM 137 139 138   POTASSIUM 3.3* 4.0 4.1   CHLORIDE 108 107 107   CO2 19* 22 23   GLUCOSE 86 115* 117*   BUN 21 11 15   CREATININE 0.77 0.72 1.25   CALCIUM 8.1* 9.1 9.8     Recent Labs     02/27/23 0135   APTT 38.1*   INR 1.72*         Recent Labs     02/27/23 0135   TRIGLYCERIDE 100   HDL 43   LDL 41       Imaging  CT-HEAD W/O   Final Result      1.  Cerebral atrophy.      2.  White matter lucencies most consistent with small vessel ischemic change versus demyelination or gliosis.      3. Stable 1.5 cm ovoid area of intraparenchymal hemorrhage in the left basal ganglia.      4. Chronic multifocal areas of lacunar type infarction noted in the basal ganglia..      5. Small chronic area of infarction in the right posterior frontal lobe.      DX-CHEST-PORTABLE (1 VIEW)   Final Result      1.  There is no acute cardiopulmonary  process.      CT-CTA NECK WITH & W/O-POST PROCESSING   Final Result      1.  Mild bilateral atherosclerosis with less than 50% ICA stenosis.      CT-CTA HEAD WITH & W/O-POST PROCESS   Final Result      1.  CT angiogram of the [place] demonstrate no evidence of large vessel occlusion.      CT-HEAD W/O   Final Result      1.  There is a left thalamic and posterior limb internal capsule focal area of hemorrhage.   2.  There is an old right posterior frontoparietal infarct with encephalomalacia.   3.  There is diffuse atrophy.   4.  Nonspecific white matter disease most consistent with chronic small vessel ischemic change again seen.      A secure VOALTE message was sent and confirmed received to RAMÓN MALGORZATA SOUZA on 2/27/2023 at 1:50 AM.         MR-BRAIN-WITH & W/O    (Results Pending)        Assessment/Plan  * Hemorrhagic stroke (HCC)- (present on admission)  Assessment & Plan  Left basal ganglia, no surgical intervention indicated  Neurology consulting and refer to the detailed recommendations as per their note  Glycemic control  Blood pressure control  No further blood thinners  PT OT speech therapy  Rehabilitation, likely SNF candidate    Oropharyngeal dysphagia  Assessment & Plan  Seen by SLP, cleared for modified diet, monitor closely  Feeding assist    Hyperglycemia  Assessment & Plan  Monitor, recheck hemoglobin A1c    Hypokalemia  Assessment & Plan  S/p replacement, monitor    Cardiac dysrhythmia- (present on admission)  Assessment & Plan  Unclear details, the patient previously on Eliquis  Continue telemetry monitoring  Currently not a candidate for anticoagulation  Reversal on admission with Kcentra    Other hyperlipidemia- (present on admission)  Assessment & Plan  Consider statin medication    Alzheimer's dementia with behavioral disturbance (HCC)- (present on admission)  Assessment & Plan  Existing, fairly advanced, continue Seroquel every afternoon    Chronic renal failure- (present on  admission)  Assessment & Plan  Currently improved, monitor, avoid nephrotoxins    Benign essential hypertension- (present on admission)  Assessment & Plan  History of, titrate medication to systolic less than 140    Plan  Ongoing diligent blood pressure control  PT/OT/SLP  Likely not a candidate for acute rehab, SNF referral  Await MRI brain  Currently no anticoagulation or antiplatelets  Avoid aspiration  Maintain normoglycemia, normothermia, eunatremia  Transfer to neuro telemetry, currently no need for IMCU the patient is off drips  See orders  Medically complex high risk  VTE prophylaxis: pharmacologic prophylaxis contraindicated due to intracranial hemorrhage    I have performed a physical exam and reviewed and updated ROS and Plan today (3/1/2023). In review of yesterday's note (2/28/2023), there are no changes except as documented above.      Please note that this dictation was created using voice recognition software. I have made every reasonable attempt to correct obvious errors, but I expect that there are errors of grammar and possibly context that I did not discover before finalizing the note.

## 2023-03-01 NOTE — DISCHARGE PLANNING
Care Transition Team Discharge Planning                   Discharge Plan:  No case management needs at this time, NCM will continue to follow and assist when the need arises.

## 2023-03-01 NOTE — CARE PLAN
Problem: Knowledge Deficit - Standard  Goal: Patient and family/care givers will demonstrate understanding of plan of care, disease process/condition, diagnostic tests and medications  Outcome: Progressing     Problem: Skin Integrity  Goal: Skin integrity is maintained or improved  Outcome: Progressing     Problem: Fall Risk  Goal: Patient will remain free from falls  Outcome: Progressing     Problem: Pain - Standard  Goal: Alleviation of pain or a reduction in pain to the patient’s comfort goal  Outcome: Progressing   The patient is Watcher - Medium risk of patient condition declining or worsening    Shift Goals  Clinical Goals: Safety, SBP < 140  Patient Goals: rest  Family Goals: safety    Progress made toward(s) clinical / shift goals:  All of the above    Patient is not progressing towards the following goals:

## 2023-03-01 NOTE — PROGRESS NOTES
Neurology Progress Note  Neurohospitalist Service, [facility]    Referring Physician: Vincent Mata M.D.    Chief Complaint   Patient presents with    Possible Stroke     Pt was at [facility] and experienced right sided weakness, dizziness, and slurred speech per patient's granddaughter       HPI: Refer to initial documented Neurology H&P, as detailed in the patient's chart.    Interval History: No acute events overnight.  No significant changes, continue to have gradual improvement in on her right-sided weakness.  She is disoriented which is her baseline with dementia.    Review of systems: In addition to what is detailed in the HPI and/or updated in the interval history, all other systems reviewed and are negative.    Past Medical History:    has a past medical history of Hypertension.    FHx:  family history is not on file.    SHx:   reports that she has never smoked. She has never used smokeless tobacco. She reports that she does not drink alcohol and does not use drugs.    Medications:    Current Facility-Administered Medications:     [START ON 3/2/2023] lisinopril (PRINIVIL) tablet 5 mg, 5 mg, Oral, Q DAY, Vincent Mata M.D.    QUEtiapine (Seroquel) tablet 25 mg, 25 mg, Oral, Nightly, Tiffany Gonzalez, A.P.R.N., 25 mg at 23 [phone]    Respiratory Therapy Consult, , Nebulization, Continuous RT, Judith Kimball    LORazepam (ATIVAN) injection 2 mg, 2 mg, Intravenous, Q5 MIN PRN, Judith Kimball    ondansetron (ZOFRAN ODT) dispertab 4 mg, 4 mg, Oral, Q4HRS PRN **OR** ondansetron (ZOFRAN) syringe/vial injection 4 mg, 4 mg, Intravenous, Q4HRS PRN, Judith Kimball    labetalol (NORMODYNE/TRANDATE) injection 10 mg, 10 mg, Intravenous, Q4HRS PRN, Judith Kimball, 10 mg at 23 467    hydrALAZINE (APRESOLINE) injection 10 mg, 10 mg, Intravenous, Q4HRS PRN, Judith Kimball    [] insulin regular (HumuLIN R,NovoLIN R) injection, 1-6 Units, Subcutaneous, Q6HRS **AND** POC  blood glucose manual result, , , Q6H **AND** NOTIFY MD and PharmD, , , Once **AND** Administer 20 grams of glucose (approximately 8 ounces of fruit juice) every 15 minutes PRN FSBG less than 70 mg/dL, , , PRN **AND** dextrose 10 % BOLUS 25 g, 25 g, Intravenous, Q15 MIN PRN, Judith Kimball    senna-docusate (PERICOLACE or SENOKOT S) 8.6-50 MG per tablet 2 Tablet, 2 Tablet, Oral, BID **AND** polyethylene glycol/lytes (MIRALAX) PACKET 1 Packet, 1 Packet, Oral, QDAY PRN **AND** magnesium hydroxide (MILK OF MAGNESIA) suspension 30 mL, 30 mL, Oral, QDAY PRN **AND** bisacodyl (DULCOLAX) suppository 10 mg, 10 mg, Rectal, QDAY PRN, Judith Kimball    acetaminophen (Tylenol) tablet 650 mg, 650 mg, Oral, Q4HRS PRN **OR** acetaminophen (TYLENOL) suppository 650 mg, 650 mg, Rectal, Q4HRS PRN, Judith Kimball    Physical Examination:    Vitals:    03/01/23 0300 03/01/23 0400 03/01/23 0500 03/01/23 0600   BP: (!) 84/55 106/61 92/58 98/66   Pulse: 78 74 72 67   Resp: 16 (!) 21 15 12   Temp:  36.1 °C (97 °F)     TempSrc:  Temporal     SpO2: 97% 98% 98% 98%   Weight:       Height:           General:   Patient is awake and in no acute distress  Neck: Full range of motion  Eyes: Midline, Pupils reactive to light.  CV: RRR  Lungs: No respiratory distress  Extremities: No cyanosis, warm, no significant edema.    NEUROLOGICAL EXAM:   Mental status: Awake, alert, but disoriented and somewhat confused.  She is able to follow simple commands in Vietnamese.    Speech and language: speech is slightly dysarthric but comprehendible.  The patient is able to name and repeat.  Cranial nerve exam: Pupils are equal, round and reactive to light bilaterally. Visual fields are full. Extraocular muscles are intact. Sensation in the face is decreased to light touch.  She has mild right facial droop.   Motor exam: Sustain antigravity in left upper and lower extremities with no downward drift.  She has antigravity with downward drift in right upper and lower  extremity.  Tone is normal. No abnormal movements were seen on exam.  Sensory exam: Sensation is intact to light touch.  Coordination: no gross ataxia noted on exam  Plantar reflexes: Equivocal  Gait: deferred    Objective Data:    Labs:  Lab Results   Component Value Date/Time    PROTHROMBTM 19.8 (H) 02/27/2023 01:35 AM    INR 1.72 (H) 02/27/2023 01:35 AM      Lab Results   Component Value Date/Time    WBC 8.7 03/01/2023 06:09 AM    RBC 4.88 03/01/2023 06:09 AM    HEMOGLOBIN 14.4 03/01/2023 06:09 AM    HEMATOCRIT 43.6 03/01/2023 06:09 AM    MCV 89.3 03/01/2023 06:09 AM    MCH 29.5 03/01/2023 06:09 AM    MCHC 33.0 (L) 03/01/2023 06:09 AM    MPV 8.9 (L) 03/01/2023 06:09 AM    NEUTSPOLYS 74.70 (H) 03/01/2023 06:09 AM    LYMPHOCYTES 16.40 (L) 03/01/2023 06:09 AM    MONOCYTES 7.80 03/01/2023 06:09 AM    EOSINOPHILS 0.60 03/01/2023 06:09 AM    BASOPHILS 0.20 03/01/2023 06:09 AM      Lab Results   Component Value Date/Time    SODIUM 138 03/01/2023 06:09 AM    POTASSIUM 4.1 03/01/2023 06:09 AM    CHLORIDE 107 03/01/2023 06:09 AM    CO2 23 03/01/2023 06:09 AM    GLUCOSE 117 (H) 03/01/2023 06:09 AM    BUN 15 03/01/2023 06:09 AM    CREATININE 1.25 03/01/2023 06:09 AM      Lab Results   Component Value Date/Time    CHOLSTRLTOT 104 02/27/2023 01:35 AM    LDL 41 02/27/2023 01:35 AM    HDL 43 02/27/2023 01:35 AM    TRIGLYCERIDE 100 02/27/2023 01:35 AM       Lab Results   Component Value Date/Time    ALKPHOSPHAT 119 (H) 02/27/2023 01:35 AM    ASTSGOT 21 02/27/2023 01:35 AM    ALTSGPT 11 02/27/2023 01:35 AM    TBILIRUBIN 0.4 02/27/2023 01:35 AM        Imaging/Testing:    I interpreted and/or reviewed the patient's neuroimaging    CT-HEAD W/O   Final Result      1.  Cerebral atrophy.      2.  White matter lucencies most consistent with small vessel ischemic change versus demyelination or gliosis.      3. Stable 1.5 cm ovoid area of intraparenchymal hemorrhage in the left basal ganglia.      4. Chronic multifocal areas of lacunar  type infarction noted in the basal ganglia..      5. Small chronic area of infarction in the right posterior frontal lobe.      DX-CHEST-PORTABLE (1 VIEW)   Final Result      1.  There is no acute cardiopulmonary process.      CT-CTA NECK WITH & W/O-POST PROCESSING   Final Result      1.  Mild bilateral atherosclerosis with less than 50% ICA stenosis.      CT-CTA HEAD WITH & W/O-POST PROCESS   Final Result      1.  CT angiogram of the [place] demonstrate no evidence of large vessel occlusion.      CT-HEAD W/O   Final Result      1.  There is a left thalamic and posterior limb internal capsule focal area of hemorrhage.   2.  There is an old right posterior frontoparietal infarct with encephalomalacia.   3.  There is diffuse atrophy.   4.  Nonspecific white matter disease most consistent with chronic small vessel ischemic change again seen.      A secure VOALTE message was sent and confirmed received to RAMÓN SOUZA on 2/27/2023 at 1:50 AM.         MR-BRAIN-WITH & W/O    (Results Pending)       Assessment and Plan:  Janine Torrez is a 83 y.o. with history of previous stroke, at presentation on Eliqu who was brought to emergency room for acute onset of right-sided weakness and numbness with onset at 1 AM on 2/27/2023 while she was at the [facility].   She has a left thalamic hemorrhage.  Her ICH score is 1.        Plan:  -q4h and PRN neuro assessment. VS per nursing/unit protocol.   -Maintain systolic blood pressure less than 140, antihypertensives per primary team.  Prefer Cardene drip  -On Eliquis at presentation, reversed with Kcentra  -MRI Brain w/wo contrast, pending.  -Telemetry; currently SR.   -Head of bed elevated at 30 degrees.  - Maintain bowel regimen to avoid Valsalva maneuver and increased intracranial pressure.  -Maintain normoglycemia, normothermia and eunatremia  - Avoid antiplatelet and anticoagulant.  - Her hemorrhage is small with ICH score of 1, does not need surgical  intervention at this point.  - Repeat head CT without contrast on 2/27/2023 at 8: 11 AM stable  -Recommend aggressive BG management per primary team. Avoid IVF with Dextrose. -BG goal . Check hemoglobin A1c.  Goal < 7  -PT/OT/SLP eval and treat for early mobilization if blood pressure is stable.  -All other medical management per primary team.   -DVT PPX: SCDs.   -Okay to transfer out of ICU.  - Neurology will follow as needed, please call us if there is any question.       Please note that this dictation was created using voice recognition software. I have made every reasonable attempt to correct obvious errors, but I expect that there are errors of grammar and possibly content that I did not discover before finalizing the note.        Molly Galloway MD  Acute Care Neurology Services

## 2023-03-01 NOTE — THERAPY
"Speech Language Pathology  Daily Treatment     Patient Name: Janine Torrez  Age:  83 y.o., Sex:  female  Medical Record #: [id number]  Today's Date: 3/1/2023     Precautions  Precautions: Fall Risk, Swallow Precautions  Comments: R inattention    Assessment    Pt seen on this date for dysphagia therapy. Per RN, pt with minimal PO intake today. Family at bedside who assisted with translation and feeding. No overt s/sx of aspiration noted with trials of MTL. Delayed onset of swallow and anterior loss of bolus noted through out trials. Pt required min cues to maintain wakefulness during session. She declined further trials of lunch. Educated family regarding role of SLP and results/recommendation of FEES that was completed yesterday and they verbalized good understanding. SLP following.    Plan    1) continue soft and bite size/MTL diet with 1:1 feeding  2) okay for ice chips and sips of thin water between meals    Continue current treatment plan.    Discharge Recommendations: Recommend post-acute placement for additional speech therapy services prior to discharge home       Objective       03/01/23 1440   Precautions   Precautions Fall Risk;Swallow Precautions   Vitals   O2 (LPM) 2   O2 Delivery Device Silicone Nasal Cannula   Pain 0 - 10 Group   Therapist Pain Assessment Post Activity Pain Same as Prior to Activity;Nurse Notified;0   Patient / Family Goals   Patient / Family Goal #1 when asked if she wanted more water she said \"no\"   Goal #1 Outcome Progressing as expected   Short Term Goals   Short Term Goal # 1 Pt will participate in a diagnostic swallow study to further assess swallow function.   Goal Outcome # 1 Goal met, new goal added   Short Term Goal # 1 B  Pt will consume an SB6/MT2 diet with no overt s/sx of aspiration   Goal Outcome  # 1 B Progressing slower than expected   Education Group   Education Provided Dysphagia   Dysphagia Patient Response Family;Acceptance;Explanation;Verbal " Demonstration;Patient;Reinforcement Needed   Anticipated Discharge Needs   Discharge Recommendations Recommend post-acute placement for additional speech therapy services prior to discharge home   Therapy Recommendations Upon DC Dysphagia Training;Community Re-Integration;Patient / Family / Caregiver Education   Interdisciplinary Plan of Care Collaboration   IDT Collaboration with  Nursing;Family / Caregiver   Patient Position at End of Therapy Seated;In Bed;Call Light within Reach;Tray Table within Reach;Phone within Reach

## 2023-03-01 NOTE — DISCHARGE PLANNING
Current documentation does not exhibit the ability to participate/tolerate IRF level of TX @ this time.  TCC will no longer follow.  Please reach out to myself once Janine is able to take a few steps or with any questions.

## 2023-03-01 NOTE — CARE PLAN
The patient is Stable - Low risk of patient condition declining or worsening    Shift Goals  Clinical Goals: -140\  Patient Goals: Rest  Family Goals: Mobilize    Progress made toward(s) clinical / shift goals:  A+Ox1, LORENZO right side weaker than left. Mobilized to chair, unable to ambulate, only pivot to chair.       Problem: Knowledge Deficit - Standard  Goal: Patient and family/care givers will demonstrate understanding of plan of care, disease process/condition, diagnostic tests and medications  Outcome: Progressing     Problem: Skin Integrity  Goal: Skin integrity is maintained or improved  Outcome: Progressing     Problem: Fall Risk  Goal: Patient will remain free from falls  Outcome: Progressing     Problem: Pain - Standard  Goal: Alleviation of pain or a reduction in pain to the patient’s comfort goal  Outcome: Progressing       Patient is not progressing towards the following goals:

## 2023-03-02 LAB
ANION GAP SERPL CALC-SCNC: 11 MMOL/L (ref 7–16)
BUN SERPL-MCNC: 23 MG/DL (ref 8–22)
CALCIUM SERPL-MCNC: 9.9 MG/DL (ref 8.5–10.5)
CHLORIDE SERPL-SCNC: 106 MMOL/L (ref 96–112)
CO2 SERPL-SCNC: 19 MMOL/L (ref 20–33)
CREAT SERPL-MCNC: 1.24 MG/DL (ref 0.5–1.4)
ERYTHROCYTE [DISTWIDTH] IN BLOOD BY AUTOMATED COUNT: 45.9 FL (ref 35.9–50)
GFR SERPLBLD CREATININE-BSD FMLA CKD-EPI: 44 ML/MIN/1.73 M 2
GLUCOSE SERPL-MCNC: 136 MG/DL (ref 65–99)
HCT VFR BLD AUTO: 47.7 % (ref 37–47)
HGB BLD-MCNC: 15.7 G/DL (ref 12–16)
MAGNESIUM SERPL-MCNC: 2.2 MG/DL (ref 1.5–2.5)
MCH RBC QN AUTO: 29.4 PG (ref 27–33)
MCHC RBC AUTO-ENTMCNC: 32.9 G/DL (ref 33.6–35)
MCV RBC AUTO: 89.3 FL (ref 81.4–97.8)
PHOSPHATE SERPL-MCNC: 3.9 MG/DL (ref 2.5–4.5)
PLATELET # BLD AUTO: 161 K/UL (ref 164–446)
PMV BLD AUTO: 8.6 FL (ref 9–12.9)
POTASSIUM SERPL-SCNC: 4.5 MMOL/L (ref 3.6–5.5)
RBC # BLD AUTO: 5.34 M/UL (ref 4.2–5.4)
SODIUM SERPL-SCNC: 136 MMOL/L (ref 135–145)
WBC # BLD AUTO: 8.6 K/UL (ref 4.8–10.8)

## 2023-03-02 PROCEDURE — 97112 NEUROMUSCULAR REEDUCATION: CPT

## 2023-03-02 PROCEDURE — 700102 HCHG RX REV CODE 250 W/ 637 OVERRIDE(OP): Performed by: HOSPITALIST

## 2023-03-02 PROCEDURE — 700102 HCHG RX REV CODE 250 W/ 637 OVERRIDE(OP): Performed by: NURSE PRACTITIONER

## 2023-03-02 PROCEDURE — A9270 NON-COVERED ITEM OR SERVICE: HCPCS | Performed by: NURSE PRACTITIONER

## 2023-03-02 PROCEDURE — A9270 NON-COVERED ITEM OR SERVICE: HCPCS

## 2023-03-02 PROCEDURE — 97535 SELF CARE MNGMENT TRAINING: CPT

## 2023-03-02 PROCEDURE — 80048 BASIC METABOLIC PNL TOTAL CA: CPT

## 2023-03-02 PROCEDURE — 99233 SBSQ HOSP IP/OBS HIGH 50: CPT | Performed by: HOSPITALIST

## 2023-03-02 PROCEDURE — 85027 COMPLETE CBC AUTOMATED: CPT

## 2023-03-02 PROCEDURE — 97530 THERAPEUTIC ACTIVITIES: CPT

## 2023-03-02 PROCEDURE — 700102 HCHG RX REV CODE 250 W/ 637 OVERRIDE(OP)

## 2023-03-02 PROCEDURE — 83735 ASSAY OF MAGNESIUM: CPT

## 2023-03-02 PROCEDURE — 770020 HCHG ROOM/CARE - TELE (206)

## 2023-03-02 PROCEDURE — A9270 NON-COVERED ITEM OR SERVICE: HCPCS | Performed by: HOSPITALIST

## 2023-03-02 PROCEDURE — 84100 ASSAY OF PHOSPHORUS: CPT

## 2023-03-02 RX ORDER — HYDRALAZINE HYDROCHLORIDE 25 MG/1
25 TABLET, FILM COATED ORAL EVERY 6 HOURS PRN
Status: DISCONTINUED | OUTPATIENT
Start: 2023-03-02 | End: 2023-03-04 | Stop reason: HOSPADM

## 2023-03-02 RX ORDER — LISINOPRIL 5 MG/1
5 TABLET ORAL TWICE DAILY
Status: DISCONTINUED | OUTPATIENT
Start: 2023-03-02 | End: 2023-03-02

## 2023-03-02 RX ADMIN — LISINOPRIL 5 MG: 5 TABLET ORAL at 05:23

## 2023-03-02 RX ADMIN — QUETIAPINE FUMARATE 25 MG: 25 TABLET ORAL at 20:19

## 2023-03-02 RX ADMIN — ACETAMINOPHEN 650 MG: 325 TABLET, FILM COATED ORAL at 20:19

## 2023-03-02 RX ADMIN — SENNOSIDES AND DOCUSATE SODIUM 2 TABLET: 50; 8.6 TABLET ORAL at 05:23

## 2023-03-02 ASSESSMENT — COGNITIVE AND FUNCTIONAL STATUS - GENERAL
PERSONAL GROOMING: A LOT
EATING MEALS: A LOT
DAILY ACTIVITIY SCORE: 9
TOILETING: TOTAL
DRESSING REGULAR UPPER BODY CLOTHING: A LOT
SUGGESTED CMS G CODE MODIFIER DAILY ACTIVITY: CL
DRESSING REGULAR LOWER BODY CLOTHING: TOTAL
HELP NEEDED FOR BATHING: TOTAL

## 2023-03-02 ASSESSMENT — PAIN DESCRIPTION - PAIN TYPE
TYPE: ACUTE PAIN

## 2023-03-02 ASSESSMENT — ENCOUNTER SYMPTOMS
WEAKNESS: 1
BACK PAIN: 1

## 2023-03-02 NOTE — CARE PLAN
The patient is Stable - Low risk of patient condition declining or worsening    Shift Goals  Clinical Goals: SBP < 140  Patient Goals: Rest  Family Goals: Updates    Progress made toward(s) clinical / shift goals:    Problem: Knowledge Deficit - Standard  Goal: Patient and family/care givers will demonstrate understanding of plan of care, disease process/condition, diagnostic tests and medications  Outcome: Progressing     Problem: Skin Integrity  Goal: Skin integrity is maintained or improved  Outcome: Progressing     Problem: Fall Risk  Goal: Patient will remain free from falls  Outcome: Progressing     Problem: Pain - Standard  Goal: Alleviation of pain or a reduction in pain to the patient’s comfort goal  Outcome: Progressing     Problem: Discharge Planning - Stroke  Goal: Ensure Stroke Core Measures are met prior to discharge  Outcome: Progressing     Problem: Neuro Status  Goal: Neuro status will remain stable or improve  Outcome: Progressing     Problem: Hemodynamic Monitoring  Goal: Patient's hemodynamics, fluid balance and neurologic status will be stable or improve  Outcome: Progressing       Patient is not progressing towards the following goals:

## 2023-03-02 NOTE — THERAPY
"Occupational Therapy  Daily Treatment     Patient Name: Janine Torrez  Age:  83 y.o., Sex:  female  Medical Record #: [id number]  Today's Date: 3/2/2023     Precautions: Fall Risk  Comments: R inattention, R moose    Assessment    Pt seen for OT tx to include: bed mobility, sitting balance, facilitation to RUE, grooming tasks. Initiated use of iPad , however pt's granddaughter present and pt more responsive to her cues. Continued using family for interpretation as more appropriate (given dementia, confusion). Family confirmed pt lives with daughter, PENG in 2nd floor apt with tub/shower. She was independent with dressing & toileting but required assist for bathing and all I-ADL. She is normally not oriented to date and sometimes does not recognize family. Pt required 24/hour supv PTA and multiple family members provided this. This session pt demos decreased strength to RUE compared to eval; only able to demo 2-5 throughout. Moves limb spontaneously and able to participate in hand-over-hand grooming (max A). Pt c/o dizziness EOB (/57), feeling \"bad\". Stood x 1 but unable to safely pivot to chair. Instructed family on facilitation of attention to R, gentle AAROM to RUE (excluding shoulder). Pt will benefit from ongoing acute OT.     Plan    Treatment Plan Status: Continue Current Treatment Plan  Type of Treatment: Self Care / Activities of Daily Living, Therapeutic Activity, Therapeutic Exercises, Neuro Re-Education / Balance, Sensory Integration Techniques, Cognitive Skill Development, Adaptive Equipment, Manual Therapy Techniques  Treatment Frequency: 3 Times per Week  Treatment Duration: Until Therapy Goals Met    DC Equipment Recommendations: Unable to determine at this time  Discharge Recommendations: Recommend post-acute placement for additional occupational therapy services prior to discharge home    Subjective    \"I feel bad. My back hurts.\"     Objective       03/02/23 1034   Cognition  "   Orientation Level Not Oriented to Place;Not Oriented to Reason;Not Oriented to Time;Not Oriented to Day;Not Oriented to Month;Not Oriented to Year  (family reports pt is disoriented to date at baseline)   Level of Consciousness Alert   Ability To Follow Commands 1 Step  (75%, need repeat cues for R side)   New Learning Impaired   Attention Impaired   Comments h/o dementia, not oriented to hospital or reason   Active ROM Upper Body   Dominant Hand Right   Comments LUE WNL; RUE limited by weakness, inattention   Strength Upper Body   Comments LUE WNL; RUE 2-/5 throughout, decreased from prior session   Sensation Upper Body   Comments LUE intact, RUE able to detect light touch, but unable to localize   Supine Upper Body Exercises   Comments AAROM to RUE for assessment, facilitation   Neuro-Muscular Treatments   Neuro-Muscular Treatments Joint Approximation;Weight Shift Left;Tactile Cuing;Verbal Cuing   Comments WB through RUE in sitting (with facilitation)   Other Treatments   Other Treatments Provided Educated family on gentle ROM, massage to RUE (excluding shoulder) for sensory input, attention to  limb   Balance   Sitting Balance (Static) Poor -   Sitting Balance (Dynamic) Trace +   Standing Balance (Static) Trace +   Standing Balance (Dynamic) Dependent   Weight Shift Sitting Poor   Weight Shift Standing Poor   Comments standing with 2-person assist   Bed Mobility    Supine to Sit Maximal Assist   Sit to Supine Maximal Assist   Scooting Maximal Assist  (seated, anteriorly)   Rolling Maximal Assist to Rt.   Activities of Daily Living   Grooming Maximal Assist  (face wipe, all-in-one oral care, hair brushing; alternating between use of L and RUE (hand-over-hand for use of R))   Functional Mobility   Sit to Stand Maximal Assist   Bed, Chair, Wheelchair Transfer Unable to Participate   Mobility EOB, STS x 1   Visual Perception   Neglect Moderate Right    Activity Tolerance   Sitting in Chair NT, unable   Sitting Edge  of Bed 10 min   Standing 15 seconds   Comments limited by weakness, feeling ill   Short Term Goals   Short Term Goal # 1 Pt will complete ADL transfers with min A   Goal Outcome # 1 Goal not met   Short Term Goal # 2 Pt will complete gown change with SBA using moose technique   Goal Outcome # 2   (NT this session)   Short Term Goal # 3 Pt will incorporate RUE into bimanual tasks as functional assist with no min cues (verbal or tactile)   Goal Outcome # 3 Goal not met

## 2023-03-02 NOTE — PROGRESS NOTES
NOC HOSPITALIST CROSS COVER    Notified by RN regarding hypotension of 59/37. The patient was downgraded from ICU status during dayshift.        Plan:  #Hypotension  -500 mL LR bolus  -Recheck BP post-infusion and notify provider if remains hypotensive        -----------------------------------------------------------------------------------------------------------    Electronically signed by:  Lala Constantino, DEANNA, APRN, ABIGAIL-BC  Hospitalist Services

## 2023-03-02 NOTE — CONSULTS
Reason for PC Consult: Advance Care Planning    Consulted by: Vincent Mata MD    Assessment:  Hospital Course: 84yo lady with c/o new onset right sided weakness witnessed at [facility] by family. Admitted through ED 2/27 with CT imaging of small left thalamic hemorrhage - non-surgical, eliquis reversed with Kcentra. MRI pending, SLP cleared for modified diet.    Consults: neurology  Prior PC Consult: none    PMH:  Alzheimer's dementia, HTN, DLD, previous CVA with residual LH numbness, CKD IIIa, on anticoagulant for unknown reason (Eliquis, was previously on ASA following TIA), bradycardia, thyroglossal cyst removal surgery (2019)      Dyspnea: No-    Last BM: 03/02/23-    Pain: No- fatigued from working with staff  Depression: Unable to determine-      Spiritual:  Is Buddhism or spirituality important for coping with this illness? Yes- Pt's son-in-law is her . She is devout Religious and attends services 4x/week.  Has a  or spiritual provider visit been requested? No    Palliative Performance Scale: 50    Advanced Directive: None-    DPOA:  -    POLST:No-      Code Status: Full- Addressed at this encounter with pt's family. Described the measures employed in a full code (including CPR, medications, and possibly defibrillation) and survival statistics.  Further explained that a DNR would not preclude any treatments/interventions offered to pt. Expressed concern that due to patient's acute situation, progressive disease, age, and co-morbidities, she is unlikely to endure invasive code measures well, and even if she was successfully resuscitated, she may come out of it with significantly reduced quality of life from even her current state.  Family verbalized understanding and appeared to feel like pt dying naturally is likely to be their choice; however, pt to remain Full Code until they speak with additional NOK and get back with IDT.    Social:   Pt lives with her dtr Aylin and son-in-law. She had  "seven children (four local, two in [facility] and one ), 30+ grandchildren, 20+ great grandchildren. Pt's spouse is . She is Italian-speaking. Pt enjoys helping to do chores at home, especially folding laundry.    Outcome:  Consult received and EMR reviewed; case discussed with Dr. Mata.    Pt was in process of transferring from ICU to neuro. Escorted her dtr Aylin, her granddtr Sona (dtr of pt's son Fidencio) and Aylin's dtr-in-law Thuy to private spot to talk. Introduced self and role of Palliative Care.  Sona and Thuy comfortable providing any needed interpretation for Aylin. ( services offered.)  Assessed family's understanding of pt's current medical status, overall health picture, and options for future care. Family expressed good understanding of the acute situation, shared further details of pt's chronic issues, and had questions re: next steps.    Provided education on stroke, stroke rehab, and dementia progression. Family shared that pt's appetite waxes & wanes (they use chocolate ensure to supplement at home), that her balance/mobility had been good (no assistive devices), and that her sleep patterns are poor d/t pt's fear (\"paranoia\") that her dtr will leave her (pt even startles awake from naps with this fear).    Explored family's/pt's values, beliefs, and preferences in order to identify GOC. See code discussion above. Family amenable to SNF d/c for rehab and list provided for their review. If pt qualifies for acute rehab @ [facility], they would want that.    Active listening, reflection, reminiscing, validation & normalization, and empathic support utilized throughout this encounter.  All questions answered.  PC contact information given. Encouraged to have any other family members reach out with any questions as well. Family expressed appreciation for support. Escorted to pt's new room on neuro.    Discussed with/Updated: Dr. Mata    Plan:  Palliative care " will f/u with family re: code status and will continue to follow, provide support, and help facilitate decision-making as clinical picture evolves.      Thank you for allowing Palliative Care to participate in this patient's care. Please feel free to call [phone] with any questions or concerns.

## 2023-03-02 NOTE — PROGRESS NOTES
Hospital Medicine Daily Progress Note    Date of Service  3/2/2023    Chief Complaint  Janine Torrez is a 83 y.o. female admitted 2/27/2023 with hemorrhagic CVA.    Hospital Course  83 y.o. female who presented 2/27/2023 with past med history hypertension, dyslipidemia, on anticoagulation for questionable reasons, possible arrhythmia, fairly advanced Alzheimer's dementia, prior CVA with residual left hand numbness, CKD stage IIIa, admitted on 2/27 report of acute onset of facial numbness, right-sided weakness involving arm and leg, the patient reportedly was at [employer] with family when suddenly she had the onset of right-sided weakness, slumping over to the right in her seat.  Patient appeared to be more altered from her baseline.  The patient usually knows her own name and where she is and cannot identify family members.  On evaluation emergency room the patient is showing a left thalamic intraparenchymal hemorrhage.  Initial NIHSS was 16, ICH score is 1.  The patient was admitted to the ICU level of care with neurology consultation and ICU level treatment.  The patient was monitored closely in the ICU, her blood pressure was controlled for blood pressure systolically less than 140, the patient was followed closely with neurochecks, her anticoagulation was reversed with Kcentra, a follow-up CT head was unchanged, neurosurgery was not involved, not a surgical lesion.  The patient was found with significant right-sided weakness upper and lower extremity, MRI is on order, currently pending.  The patient is on telemetry, in a sinus rhythm  On my reevaluation the patient continues to be confused, poorly responding to verbal cues and commands  She is moving her left side spontaneously, but she does move her right side on command weakly.    Interval Problem Update  Patient seen and examined today.  Data, Medication data reviewed.  Case discussed with nursing as available.  Plan of Care reviewed with patient and  notified of changes.   3/1 the patient remains very lethargic, she moves her left side fairly spontaneously, less on the right, afebrile, heart rate in the 80s, respiration 16, the patient is on 2 L nasal cannula oxygen, blood pressure with improved control, currently 109/64, laboratory data is reviewed, neurology follow-up noted, no further need for Cardene drip, okay to transfer to neuro with telemetry.  MRI pending.  Ongoing therapies, the patient is cleared for a diet  3/2 patient appears somewhat more awake, she still has significant right-sided weakness, left preference, she denies currently a headache, she apparently complains of some back pain to other providers, had some difficulty last night with low blood pressure on blood pressure medication, fluctuating blood pressure readings still, but overall less than 140.  Therapy notes recommend postacute therapy needs.  Engaging with palliative care for GOC, POLST possible, CODE STATUS discussion    I have discussed this patient's plan of care and discharge plan at IDT rounds today with Case Management, Nursing, Nursing leadership, and other members of the IDT team.    Consultants/Specialty  critical care and neurology  Palliative care  Code Status  Full Code    Disposition  Patient is not medically cleared for discharge.   Anticipate discharge to to skilled nursing facility.  I have placed the appropriate orders for post-discharge needs.    Review of Systems  Review of Systems   Unable to perform ROS: Dementia   Musculoskeletal:  Positive for back pain.   Neurological:  Positive for weakness.      Physical Exam  Temp:  [36.2 °C (97.1 °F)-36.5 °C (97.7 °F)] 36.5 °C (97.7 °F)  Pulse:  [] 83  Resp:  [12-33] 15  BP: ()/() 127/86  SpO2:  [92 %-100 %] 94 %    Physical Exam  Vitals and nursing note reviewed.   Constitutional:       Appearance: She is well-developed. She is not diaphoretic.      Comments: Older female, lethargic, left gaze preference    HENT:      Head: Normocephalic and atraumatic.      Nose: Nose normal.   Eyes:      Conjunctiva/sclera: Conjunctivae normal.      Pupils: Pupils are equal, round, and reactive to light.   Neck:      Thyroid: No thyromegaly.      Vascular: No JVD.   Cardiovascular:      Rate and Rhythm: Normal rate and regular rhythm.      Heart sounds: Normal heart sounds.     No friction rub. No gallop.   Pulmonary:      Effort: Pulmonary effort is normal.      Breath sounds: Normal breath sounds. No wheezing or rales.   Abdominal:      General: Bowel sounds are normal. There is no distension.      Palpations: Abdomen is soft. There is no mass.      Tenderness: There is no abdominal tenderness. There is no guarding or rebound.   Musculoskeletal:         General: No tenderness. Normal range of motion.      Cervical back: Normal range of motion and neck supple.   Lymphadenopathy:      Cervical: No cervical adenopathy.   Skin:     General: Skin is warm and dry.   Neurological:      Cranial Nerves: No cranial nerve deficit.      Comments: Left facial droop  Right upper right lower extremity weakness  Right neglect, left preference  Sensory loss left face        Fluids    Intake/Output Summary (Last 24 hours) at 3/2/2023 1453  Last data filed at 3/2/2023 1200  Gross per 24 hour   Intake 1158.1 ml   Output 400 ml   Net 758.1 ml         Laboratory  Recent Labs     02/28/23  0400 03/01/23  0609 03/02/23  0607   WBC 8.5 8.7 8.6   RBC 4.75 4.88 5.34   HEMOGLOBIN 14.1 14.4 15.7   HEMATOCRIT 41.6 43.6 47.7*   MCV 87.6 89.3 89.3   MCH 29.7 29.5 29.4   MCHC 33.9 33.0* 32.9*   RDW 44.1 45.5 45.9   PLATELETCT 193 166 161*   MPV 9.3 8.9* 8.6*       Recent Labs     02/28/23  0400 03/01/23  0609 03/02/23  0607   SODIUM 139 138 136   POTASSIUM 4.0 4.1 4.5   CHLORIDE 107 107 106   CO2 22 23 19*   GLUCOSE 115* 117* 136*   BUN 11 15 23*   CREATININE 0.72 1.25 1.24   CALCIUM 9.1 9.8 9.9                         Imaging  CT-HEAD W/O   Final Result      1.   Cerebral atrophy.      2.  White matter lucencies most consistent with small vessel ischemic change versus demyelination or gliosis.      3. Stable 1.5 cm ovoid area of intraparenchymal hemorrhage in the left basal ganglia.      4. Chronic multifocal areas of lacunar type infarction noted in the basal ganglia..      5. Small chronic area of infarction in the right posterior frontal lobe.      DX-CHEST-PORTABLE (1 VIEW)   Final Result      1.  There is no acute cardiopulmonary process.      CT-CTA NECK WITH & W/O-POST PROCESSING   Final Result      1.  Mild bilateral atherosclerosis with less than 50% ICA stenosis.      CT-CTA HEAD WITH & W/O-POST PROCESS   Final Result      1.  CT angiogram of the [place] demonstrate no evidence of large vessel occlusion.      CT-HEAD W/O   Final Result      1.  There is a left thalamic and posterior limb internal capsule focal area of hemorrhage.   2.  There is an old right posterior frontoparietal infarct with encephalomalacia.   3.  There is diffuse atrophy.   4.  Nonspecific white matter disease most consistent with chronic small vessel ischemic change again seen.      A secure VOALTE message was sent and confirmed received to RAMÓN SOUZA on 2/27/2023 at 1:50 AM.         MR-BRAIN-WITH & W/O    (Results Pending)          Assessment/Plan  * Hemorrhagic stroke (HCC)- (present on admission)  Assessment & Plan  Left basal ganglia, no surgical intervention indicated  Neurology consulting and refer to the detailed recommendations as per their note  Glycemic control  Blood pressure control  No further blood thinners  PT OT speech therapy  Rehabilitation, likely SNF candidate    Oropharyngeal dysphagia  Assessment & Plan  Seen by SLP, cleared for modified diet, monitor closely  Feeding assist    Hyperglycemia  Assessment & Plan  Monitor, recheck hemoglobin A1c    Hypokalemia  Assessment & Plan  S/p replacement, monitor    Cardiac dysrhythmia- (present on  admission)  Assessment & Plan  Unclear details, the patient previously on Eliquis  Continue telemetry monitoring  Currently not a candidate for anticoagulation  Reversal on admission with Kcentra    Other hyperlipidemia- (present on admission)  Assessment & Plan  Consider statin medication    Alzheimer's dementia with behavioral disturbance (HCC)- (present on admission)  Assessment & Plan  Existing, fairly advanced, continue Seroquel every afternoon    Chronic renal failure- (present on admission)  Assessment & Plan  Currently improved, monitor, avoid nephrotoxins    Benign essential hypertension- (present on admission)  Assessment & Plan  History of, titrate medication to systolic less than 140    Plan  Appreciate palliative care input  Ongoing diligent blood pressure control  PT/OT/SLP  Likely not a candidate for acute rehab, SNF referral  Await MRI brain, still pending  Currently no anticoagulation or antiplatelets  Avoid aspiration, respiratory compromise  Maintain normoglycemia, normothermia, eunatremia  Transfer to neuro telemetry  See orders  Medically complex high risk  VTE prophylaxis: pharmacologic prophylaxis contraindicated due to intracranial hemorrhage    I have performed a physical exam and reviewed and updated ROS and Plan today (3/2/2023). In review of yesterday's note (3/1/2023), there are no changes except as documented above.      Please note that this dictation was created using voice recognition software. I have made every reasonable attempt to correct obvious errors, but I expect that there are errors of grammar and possibly context that I did not discover before finalizing the note.

## 2023-03-02 NOTE — CARE PLAN
The patient is Watcher - Medium risk of patient condition declining or worsening    Shift Goals  Clinical Goals: Eat, SBP <140  Patient Goals: Rest  Family Goals: Rest    Progress made toward(s) clinical / shift goals:      Problem: Skin Integrity  Goal: Skin integrity is maintained or improved  Outcome: Progressing     Problem: Fall Risk  Goal: Patient will remain free from falls  Outcome: Progressing     Problem: Pain - Standard  Goal: Alleviation of pain or a reduction in pain to the patient’s comfort goal  Outcome: Progressing     Problem: Neuro Status  Goal: Neuro status will remain stable or improve  Outcome: Progressing       Patient is not progressing towards the following goals:  NA       Patient advised DONE

## 2023-03-03 ENCOUNTER — APPOINTMENT (OUTPATIENT)
Dept: RADIOLOGY | Facility: MEDICAL CENTER | Age: 83
DRG: 065 | End: 2023-03-03
Attending: STUDENT IN AN ORGANIZED HEALTH CARE EDUCATION/TRAINING PROGRAM
Payer: MEDICARE

## 2023-03-03 LAB
SARS-COV+SARS-COV-2 AG RESP QL IA.RAPID: NOTDETECTED
SPECIMEN SOURCE: NORMAL

## 2023-03-03 PROCEDURE — 700101 HCHG RX REV CODE 250: Performed by: NURSE PRACTITIONER

## 2023-03-03 PROCEDURE — A9270 NON-COVERED ITEM OR SERVICE: HCPCS | Performed by: NURSE PRACTITIONER

## 2023-03-03 PROCEDURE — 99232 SBSQ HOSP IP/OBS MODERATE 35: CPT | Performed by: STUDENT IN AN ORGANIZED HEALTH CARE EDUCATION/TRAINING PROGRAM

## 2023-03-03 PROCEDURE — 87426 SARSCOV CORONAVIRUS AG IA: CPT

## 2023-03-03 PROCEDURE — 770020 HCHG ROOM/CARE - TELE (206)

## 2023-03-03 PROCEDURE — 70553 MRI BRAIN STEM W/O & W/DYE: CPT

## 2023-03-03 PROCEDURE — 700117 HCHG RX CONTRAST REV CODE 255: Performed by: STUDENT IN AN ORGANIZED HEALTH CARE EDUCATION/TRAINING PROGRAM

## 2023-03-03 PROCEDURE — 700102 HCHG RX REV CODE 250 W/ 637 OVERRIDE(OP)

## 2023-03-03 PROCEDURE — 700102 HCHG RX REV CODE 250 W/ 637 OVERRIDE(OP): Performed by: NURSE PRACTITIONER

## 2023-03-03 PROCEDURE — A9270 NON-COVERED ITEM OR SERVICE: HCPCS

## 2023-03-03 PROCEDURE — A9579 GAD-BASE MR CONTRAST NOS,1ML: HCPCS | Performed by: STUDENT IN AN ORGANIZED HEALTH CARE EDUCATION/TRAINING PROGRAM

## 2023-03-03 RX ADMIN — QUETIAPINE FUMARATE 25 MG: 25 TABLET ORAL at 20:55

## 2023-03-03 RX ADMIN — ACETAMINOPHEN 650 MG: 325 TABLET, FILM COATED ORAL at 17:22

## 2023-03-03 RX ADMIN — ACETAMINOPHEN 650 MG: 325 TABLET, FILM COATED ORAL at 10:41

## 2023-03-03 RX ADMIN — LABETALOL HYDROCHLORIDE 10 MG: 5 INJECTION, SOLUTION INTRAVENOUS at 19:37

## 2023-03-03 RX ADMIN — GADOTERIDOL 12 ML: 279.3 INJECTION, SOLUTION INTRAVENOUS at 14:36

## 2023-03-03 ASSESSMENT — PAIN DESCRIPTION - PAIN TYPE
TYPE: ACUTE PAIN

## 2023-03-03 ASSESSMENT — ENCOUNTER SYMPTOMS
WEAKNESS: 1
BACK PAIN: 1

## 2023-03-03 NOTE — PROGRESS NOTES
Hospital Medicine Daily Progress Note    Date of Service  3/3/2023    Chief Complaint  Janine Torrez is a 83 y.o. female admitted 2/27/2023 with hemorrhagic CVA.    Hospital Course  83 y.o. female who presented 2/27/2023 with past med history hypertension, dyslipidemia, on anticoagulation for questionable reasons, possible arrhythmia, fairly advanced Alzheimer's dementia, prior CVA with residual left hand numbness, CKD stage IIIa, admitted on 2/27 report of acute onset of facial numbness, right-sided weakness involving arm and leg, the patient reportedly was at [employer] with family when suddenly she had the onset of right-sided weakness, slumping over to the right in her seat.  Patient appeared to be more altered from her baseline.  The patient usually knows her own name and where she is and cannot identify family members.  On evaluation emergency room the patient is showing a left thalamic intraparenchymal hemorrhage.  Initial NIHSS was 16, ICH score is 1.  The patient was admitted to the ICU level of care with neurology consultation and ICU level treatment.  The patient was monitored closely in the ICU, her blood pressure was controlled for blood pressure systolically less than 140, the patient was followed closely with neurochecks, her anticoagulation was reversed with Kcentra, a follow-up CT head was unchanged, neurosurgery was not involved, not a surgical lesion.  The patient was found with significant right-sided weakness upper and lower extremity, MRI is on order, currently pending.  The patient is on telemetry, in a sinus rhythm  On my reevaluation the patient continues to be confused, poorly responding to verbal cues and commands  She is moving her left side spontaneously, but she does move her right side on command weakly.    Interval Problem Update  No acute events overnight.  Patient able to move her right side some, follows simple commands in [place].  Patient remains normotensive, continue blood  pressure control.  MRI brain pending.  Anticipate patient will be medically cleared to discharge to SNF after MRI brain reviewed.      I have discussed this patient's plan of care and discharge plan at IDT rounds today with Case Management, Nursing, Nursing leadership, and other members of the IDT team.    Consultants/Specialty  critical care and neurology  Palliative care  Code Status  Full Code    Disposition  Patient is not medically cleared for discharge.   Anticipate discharge to to skilled nursing facility.  I have placed the appropriate orders for post-discharge needs.    Review of Systems  Review of Systems   Unable to perform ROS: Dementia   Musculoskeletal:  Positive for back pain.   Neurological:  Positive for weakness.      Physical Exam  Temp:  [36.3 °C (97.3 °F)-37.2 °C (99 °F)] 36.5 °C (97.7 °F)  Pulse:  [68-85] 79  Resp:  [16] 16  BP: (100-137)/(74-94) 132/88  SpO2:  [92 %-98 %] 96 %    Physical Exam  Vitals and nursing note reviewed.   Constitutional:       Appearance: She is well-developed. She is not diaphoretic.      Comments: Older female, lethargic, left gaze preference   HENT:      Head: Normocephalic and atraumatic.      Nose: Nose normal.   Eyes:      Conjunctiva/sclera: Conjunctivae normal.      Pupils: Pupils are equal, round, and reactive to light.   Neck:      Thyroid: No thyromegaly.      Vascular: No JVD.   Cardiovascular:      Rate and Rhythm: Normal rate and regular rhythm.      Heart sounds: Normal heart sounds.     No friction rub. No gallop.   Pulmonary:      Effort: Pulmonary effort is normal.      Breath sounds: Normal breath sounds. No wheezing or rales.   Abdominal:      General: Bowel sounds are normal. There is no distension.      Palpations: Abdomen is soft. There is no mass.      Tenderness: There is no abdominal tenderness. There is no guarding or rebound.   Musculoskeletal:         General: No tenderness. Normal range of motion.      Cervical back: Normal range of motion  and neck supple.   Lymphadenopathy:      Cervical: No cervical adenopathy.   Skin:     General: Skin is warm and dry.   Neurological:      Cranial Nerves: No cranial nerve deficit.      Comments: Left facial droop  Right upper right lower extremity weakness  Right neglect, left preference  Sensory loss left face        Fluids    Intake/Output Summary (Last 24 hours) at 3/3/2023 1433  Last data filed at 3/3/2023 0500  Gross per 24 hour   Intake --   Output 150 ml   Net -150 ml       Laboratory  Recent Labs     03/01/23  0609 03/02/23  0607   WBC 8.7 8.6   RBC 4.88 5.34   HEMOGLOBIN 14.4 15.7   HEMATOCRIT 43.6 47.7*   MCV 89.3 89.3   MCH 29.5 29.4   MCHC 33.0* 32.9*   RDW 45.5 45.9   PLATELETCT 166 161*   MPV 8.9* 8.6*     Recent Labs     03/01/23  0609 03/02/23  0607   SODIUM 138 136   POTASSIUM 4.1 4.5   CHLORIDE 107 106   CO2 23 19*   GLUCOSE 117* 136*   BUN 15 23*   CREATININE 1.25 1.24   CALCIUM 9.8 9.9                       Imaging  CT-HEAD W/O   Final Result      1.  Cerebral atrophy.      2.  White matter lucencies most consistent with small vessel ischemic change versus demyelination or gliosis.      3. Stable 1.5 cm ovoid area of intraparenchymal hemorrhage in the left basal ganglia.      4. Chronic multifocal areas of lacunar type infarction noted in the basal ganglia..      5. Small chronic area of infarction in the right posterior frontal lobe.      DX-CHEST-PORTABLE (1 VIEW)   Final Result      1.  There is no acute cardiopulmonary process.      CT-CTA NECK WITH & W/O-POST PROCESSING   Final Result      1.  Mild bilateral atherosclerosis with less than 50% ICA stenosis.      CT-CTA HEAD WITH & W/O-POST PROCESS   Final Result      1.  CT angiogram of the [place] demonstrate no evidence of large vessel occlusion.      CT-HEAD W/O   Final Result      1.  There is a left thalamic and posterior limb internal capsule focal area of hemorrhage.   2.  There is an old right posterior frontoparietal  infarct with encephalomalacia.   3.  There is diffuse atrophy.   4.  Nonspecific white matter disease most consistent with chronic small vessel ischemic change again seen.      A secure VOALTE message was sent and confirmed received to RAMÓN MCGARRY LIBBY on 2/27/2023 at 1:50 AM.         MR-BRAIN-WITH & W/O    (Results Pending)          Assessment/Plan  * Hemorrhagic stroke (HCC)- (present on admission)  Assessment & Plan  Left basal ganglia, no surgical intervention indicated  Neurology consulting and refer to the detailed recommendations as per their note  Glycemic control  Blood pressure control  No further blood thinners  PT OT speech therapy  Rehabilitation, likely SNF candidate    Oropharyngeal dysphagia  Assessment & Plan  Seen by SLP, cleared for modified diet, monitor closely  Feeding assist    Hyperglycemia  Assessment & Plan  Monitor, recheck hemoglobin A1c    Hypokalemia  Assessment & Plan  S/p replacement, monitor    Cardiac dysrhythmia- (present on admission)  Assessment & Plan  Unclear details, the patient previously on Eliquis  Continue telemetry monitoring  Currently not a candidate for anticoagulation  Reversal on admission with Kcentra  Discontinue eliquis on discharge as unclear indication    Other hyperlipidemia- (present on admission)  Assessment & Plan  Consider statin medication    Alzheimer's dementia with behavioral disturbance (HCC)- (present on admission)  Assessment & Plan  Existing, fairly advanced, continue Seroquel every afternoon    Chronic renal failure- (present on admission)  Assessment & Plan  Currently improved, monitor, avoid nephrotoxins    Benign essential hypertension- (present on admission)  Assessment & Plan  History of, titrate medication to systolic less than 140      VTE prophylaxis: pharmacologic prophylaxis contraindicated due to intracranial hemorrhage

## 2023-03-03 NOTE — DISCHARGE PLANNING
Received Choice Form @: 0064  Agency/ Facility Name: [facility]  Referral Sent per Choice Form @: 5166

## 2023-03-03 NOTE — PROGRESS NOTES
4 Eyes Skin Assessment Completed by MERVIN Handy and MERVIN Ge.    Head WDL  Ears WDL  Nose WDL  Mouth WDL  Neck WDL  Breast/Chest WDL  Shoulder Blades WDL  Spine WDL  (R) Arm/Elbow/Hand Edema  (L) Arm/Elbow/Hand Edema- L forearm IV   Abdomen WDL  Groin WDL- Purewick in place   Scrotum/Coccyx/Buttocks WDL  (R) Leg Edema  (L) Leg Edema  (R) Heel/Foot/Toe WDL  (L) Heel/Foot/Toe WDL          Devices In Places Tele Box, Blood Pressure Cuff, Pulse Ox, SCD's, and Nasal Cannula, Purewick       Interventions In Place NC W/Ear Foams, Heel Mepilex, Q2 Turns, Barrier Cream, Heels Loaded W/Pillows, and Pressure Redistribution Mattress    Possible Skin Injury No    Pictures Uploaded Into Epic N/A  Wound Consult Placed N/A  RN Wound Prevention Protocol Ordered No

## 2023-03-03 NOTE — DOCUMENTATION QUERY
[facility]                                                                       Query Response Note      PATIENT:               JUN LIVINGSTON  ACCT #:                  [id number]  MRN:                     [id number]  :                      1940  ADMIT DATE:       2023 1:32 AM  DISCH DATE:          RESPONDING  PROVIDER #:        [id number]           QUERY TEXT:    Based on your medical judgment of the clinical indicators outlined above, please clarify if you are treating this patient for a known or suspected:    NOTE:  If an appropriate response is not listed below, please respond with a new note.    Thank you.    The patient's Clinical Indicators include:  3/1 Hospitalist Note: Hypotension of 59/37. 500 mL LR bolus.  3/2 Hospitalist Note:  Had some difficulty last night with low blood pressure.  3/1 Vitals: BP 63/38, MAP 46    Risk factor: Hypotension    Treatment: IV fluid resuscitation    Thank you,  Milagros Arroela  Clinical   Connect via Jascha  Options provided:   -- Hypovolemic shock   -- Other shock, Please specify   -- Hypotension without shock   -- Other explanation, Please specify   -- Unable to determine      Query created by: Milagros Arreola on 3/3/2023 6:44 AM    RESPONSE TEXT:    Hypotension without shock          Electronically signed by:  EMELINA TEMPLETON MD 3/3/2023 1:28 PM

## 2023-03-03 NOTE — CARE PLAN
The patient is Stable - Low risk of patient condition declining or worsening    Shift Goals  Clinical Goals: SBP<140, MRI, safety  Patient Goals: comfort  Family Goals: flores    Progress made toward(s) clinical / shift goals:    Problem: Knowledge Deficit - Standard  Goal: Patient and family/care givers will demonstrate understanding of plan of care, disease process/condition, diagnostic tests and medications  Outcome: Progressing     Problem: Skin Integrity  Goal: Skin integrity is maintained or improved  Outcome: Progressing     Problem: Fall Risk  Goal: Patient will remain free from falls  Outcome: Progressing     Problem: Pain - Standard  Goal: Alleviation of pain or a reduction in pain to the patient’s comfort goal  Outcome: Progressing     Problem: Knowledge Deficit - Stroke Education  Goal: Patient's knowledge of stroke and risk factors will improve  Outcome: Progressing     Problem: Neuro Status  Goal: Neuro status will remain stable or improve  Outcome: Progressing     Problem: Dysphagia  Goal: Dysphagia will improve  Outcome: Progressing     Problem: Risk for Aspiration  Goal: Patient's risk for aspiration will be absent or decrease  Outcome: Progressing       Patient is not progressing towards the following goals:

## 2023-03-03 NOTE — CARE PLAN
The patient is Stable - Low risk of patient condition declining or worsening    Shift Goals  Clinical Goals: SBP <140; Neuro Status; Safety; Comfort  Patient Goals: Rest  Family Goals: Comfort; Rets; MRI    Progress made toward(s) clinical / shift goals:  Assumed care of pt at 1845. POC discussed with pt. Pt A/Ox 1-2 with orientation to self and intermittently place. Pt has baseline dementia. Family at bedside. Updated on plan of care. Pt on fall/seizure/aspiration precautions. Pt on Q4hr neuro checks and Q2hr turns. Pt educated to use the call light for assistance. Call light within reach. Pt rounded on frequently throughout the shift.      Problem: Skin Integrity  Goal: Skin integrity is maintained or improved  Outcome: Progressing  Note:  Pt turned and repositioned Q2H or as requested. Barrier cream and mepilexes used to prevent skin breakdown on delicate perineal areas and bony prominences. Linen changes provided as needed to avoid risk of developing pressure ulcers.      Problem: Fall Risk  Goal: Patient will remain free from falls  Outcome: Progressing  Note: Fall precautions in place. Bed locked and in lowest position, bed alarm in place, treaded socks used when ambulated, call light within reach. Pt educated to call for assistance. Pt rounded on frequently throughout shift.       Problem: Pain - Standard  Goal: Alleviation of pain or a reduction in pain to the patient’s comfort goal  Outcome: Progressing  Note: Pt's pain assessed throughout shift. Patient offered pharmacological and non-pharmacological interventions.      Problem: Optimal Care of the Stroke Patient  Goal: Optimal acute care for the stroke patient  Outcome: Progressing  Note: Pt on CVA protocol.     Problem: Neuro Status  Goal: Neuro status will remain stable or improve  Outcome: Progressing  Note: Pt's neuro status assessed throughout the shift with frequent neuro checks. Pt has maintained a stable neuro status.         Patient is not  progressing towards the following goals:      Problem: Knowledge Deficit - Standard  Goal: Patient and family/care givers will demonstrate understanding of plan of care, disease process/condition, diagnostic tests and medications  Outcome: Not Progressing     Problem: Knowledge Deficit - Stroke Education  Goal: Patient's knowledge of stroke and risk factors will improve    Outcome: Not Progressing

## 2023-03-04 VITALS
OXYGEN SATURATION: 97 % | HEART RATE: 69 BPM | WEIGHT: 131.39 LBS | SYSTOLIC BLOOD PRESSURE: 135 MMHG | DIASTOLIC BLOOD PRESSURE: 92 MMHG | HEIGHT: 59 IN | RESPIRATION RATE: 18 BRPM | BODY MASS INDEX: 26.49 KG/M2 | TEMPERATURE: 97.5 F

## 2023-03-04 PROCEDURE — 99239 HOSP IP/OBS DSCHRG MGMT >30: CPT | Performed by: STUDENT IN AN ORGANIZED HEALTH CARE EDUCATION/TRAINING PROGRAM

## 2023-03-04 ASSESSMENT — PAIN DESCRIPTION - PAIN TYPE: TYPE: ACUTE PAIN

## 2023-03-04 NOTE — DISCHARGE INSTRUCTIONS
Diet  Cardiac diet, soft and bite size, mild thick liquid    Activity    Resume Your Normal Activity    You may resume your normal activity as tolerated.  Rest as needed.      Stroke / CVA / TIA / Hemorrhagic Ischemia Discharge Instructions    You have had a stroke. Your risk factors have been identified as follows:  Age - Over 55, HTN, previous stroke, CKD, DLD    It is important that you reduce your risk factors to avoid another stroke in the future. Here are some general guidelines to follow:    Eat healthy - avoid food high in fat  Maintain a healthy weight  Avoid alcohol and illegal drug use Get regular exercise  Avoid smoking  Take your medications as directed     For more information regarding risk factors, refer to pages 17-19 in your Stroke Patient Education Guide. Stroke Education Guide was given to patient.    Warning signs of a stroke include (which can also be found on page 3 of your Stroke Patient Education Guide):  Sudden numbness of weakness of the face, arm or leg (especially on one side of the body).  Sudden confusion, trouble speaking or understanding.  Sudden trouble seeing in one or both eyes.  Sudden trouble walking, dizziness, loss of balance or coordination.  Sudden severe headache with no known cause.  It is very important to get treatment quickly when a stroke occurs. If you experience any of the above warning signs, call 911 immediately.     Some patients who have had a stroke will be going home on a blood thinner medication called Warfarin (Coumadin).  This medication requires very close monitoring and follow up.  This follow up can be provided by either your Primary Care Physician or by [facility]'s Outpatient Anticoagulation Service.  The Outpatient Anticoagulation Service is located at the [facility] at [facility] ([facility]).  If you do not know when your follow up appointment is scheduled, call [phone] to verify your  appointment time.

## 2023-03-04 NOTE — DISCHARGE PLANNING
Case Management Discharge Planning    Admission Date: 2/27/2023  GMLOS: 2.9  ALOS: 4    6-Clicks ADL Score: 9  6-Clicks Mobility Score: 10  PT and/or OT Eval ordered: Yes  Post-acute Referrals Ordered: Yes  Post-acute Choice Obtained: Yes  Has referral(s) been sent to post-acute provider:  Yes    Anticipated Discharge Dispo:  SNF    DME Needed: No    Action(s) Taken: Updated Provider/Nurse on Discharge Plan and Acceptance Received    Escalations Completed: None    Medically Clear: No    Next Steps: MORIS called patient's children to assist with placement. MORIS spoke to Bolivar Fatima [phone], Bolivar discussed with his siblings and gave choice. Choice form sent to [facility]. [facility] accepted patient, MORIS spoke to Marie [phone] who said they are able to take weekend admissions. MORIS requested COVID swab, pending results. PASRR [id number]. Pending COVID result and discharge summary. Packet started, handoff provided to weekend staff.     Barriers to Discharge: Medical clearance    Is the patient up for discharge tomorrow: Yes    Is transport arranged for discharge disposition: No

## 2023-03-04 NOTE — CARE PLAN
The patient is Stable - Low risk of patient condition declining or worsening    Shift Goals  Clinical Goals: Monitor neuro status, MRI, SBP <140  Patient Goals: Rest  Family Goals: NONI    Progress made toward(s) clinical / shift goals:  Patient A&Ox1-2. Educated patient and family at bedside on POC. Medicated patient per MAR for SBP outside of parameters, goal is SBP <140. Q2 hour turns in place. Patient is on 2L NC. Female wick in use for voiding. Bed is low and locked. Bed alarm on. Call light within reach. Hourly rounding continues.     Patient is not progressing towards the following goals:      Problem: Knowledge Deficit - Standard  Goal: Patient and family/care givers will demonstrate understanding of plan of care, disease process/condition, diagnostic tests and medications  Outcome: Not Progressing  Note: Patient requires reinforced education. A&Ox1-2 at baseline.      Problem: Knowledge Deficit - Stroke Education  Goal: Patient's knowledge of stroke and risk factors will improve  Outcome: Not Progressing  Note: Patient requires reinforced education.      Problem: Mobility - Stroke  Goal: Patient's capacity to carry out activities will improve  Outcome: Not Progressing     Problem: Self Care  Goal: Patient will have the ability to perform ADLs independently or with assistance (bathe, groom, dress, toilet and feed)  Outcome: Not Progressing  Note: Patient will need assistance.

## 2023-03-04 NOTE — DISCHARGE PLANNING
Case Management Discharge Planning    LOC submitted by CM; Currently in Manual Review.  Patient scheduled for SNF transfer @ 1300 Hrs today.      CM spoke to Marie @ [facility] #[phone] who accepted patient for transfer today while LOC still pending with agreement for CM to f/u on LOC completion.

## 2023-03-04 NOTE — DISCHARGE PLANNING
Transport form faxed to [facility]. 1300 hour pick-up time requested.   DOROTEO placed a call and notified family of the time of transport .  Transport form faxed to [facility].   DOROTEO placed a call to Marie at [facility]  [phone] to inform her of the projected ETA of the patient. Marie requested that the patient's arrival to be scheduled for 1400 instead of 1300. [facility]  and [facility]  FAXED INFORMATION AGAIN.   DOROTEO met with the patient's daughter at the bedside to present the IMM. Patient's daughter, Aylin was at the bedside. [place] IMM was given.  IMM was signed by Aylin. Information was translated over the phone by another daughter of the patient ( Ashwini ) and the IMM was explained, also the destination of where the patient was being discharged to and the time as well as the mode of transportation.   The address of the facility was provided to the Aylin at the bedside and Ashwini via telephone  ( [phone]) per Aylin' request.

## 2023-03-04 NOTE — DISCHARGE SUMMARY
Discharge Summary    CHIEF COMPLAINT ON ADMISSION  Chief Complaint   Patient presents with    Possible Stroke     Pt was at [facility] and experienced right sided weakness, dizziness, and slurred speech per patient's granddaughter       Reason for Admission  Stroke Pre-Alert    Admission Date  2/27/2023     CODE STATUS  Full Code    HPI & HOSPITAL COURSE  83 y.o. female who presented 2/27/2023 with past med history hypertension, dyslipidemia, on anticoagulation for heart arrhythmia, fairly advanced Alzheimer's dementia, prior CVA with residual left hand numbness, CKD stage IIIa, admitted on 2/27 report of acute onset of facial numbness, right-sided weakness involving arm and leg, the patient reportedly was at [facility] with family when suddenly she had the onset of right-sided weakness, slumping over to the right in her seat.  Patient appeared to be more altered from her baseline.  The patient usually knows her own name and where she is and cannot identify family members.  On evaluation emergency room the patient is showing a left thalamic intraparenchymal hemorrhage.  Initial NIHSS was 16, ICH score is 1.  The patient was admitted to the ICU level of care with neurology consultation and ICU level treatment.  The patient was monitored closely in the ICU, her blood pressure was controlled for blood pressure systolically less than 140 with nicardipine drip. The patient was followed closely with neurochecks, her anticoagulation was reversed with Kcentra, a follow-up CT head was unchanged, neurosurgery was not involved, not a surgical lesion.  The patient was found with significant right-sided weakness upper and lower extremity, MRI brain shows 2.2 x 1.1 cm subacute hemorrhage in left thalamus/left posterior limb internal capsule, with no nodule enhancement or flow voids to suggest neoplasm or vascular malformation. Patient able to move right side on command, although weaker than left. Her home eliquis has been discontinued  from medication list as no clear indication for its use. Patient doing well, she is accepted to acute inpatient rehab for continued therapy services. Her blood pressure remains normotensive without medications, home hypertension medications discontinued at time of discharge, these may be restarted as needed.      Therefore, she is discharged in fair and stable condition to an inpatient rehabilitation hospital.    The patient met 2-midnight criteria for an inpatient stay at the time of discharge.      FOLLOW UP ITEMS POST DISCHARGE  Take medications as prescribed.  Follow up per acute rehab, follow up with PCP.    DISCHARGE DIAGNOSES  Principal Problem:    Hemorrhagic stroke (HCC) POA: Yes  Active Problems:    Benign essential hypertension POA: Yes    Chronic renal failure POA: Yes    Alzheimer's dementia with behavioral disturbance (HCC) POA: Yes    Other hyperlipidemia POA: Yes    Cardiac dysrhythmia POA: Yes    Hypokalemia POA: Unknown    Hyperglycemia POA: Unknown    Oropharyngeal dysphagia POA: Unknown  Resolved Problems:    * No resolved hospital problems. *      FOLLOW UP  No future appointments.  [facility]  [address] [place] [address]  [phone]          MEDICATIONS ON DISCHARGE     Medication List        CONTINUE taking these medications        Instructions   hydrocortisone 1 % Crea   Apply 1 Application topically 2 times a day.  Dose: 1 Application     hydrocortisone 25 MG Supp  Commonly known as: ANUSOL-HC   Insert 1 Suppository into the rectum every 12 hours.  Dose: 25 mg     QUEtiapine 25 MG Tabs  Commonly known as: Seroquel   Take 25 mg by mouth every day.  Dose: 25 mg            STOP taking these medications      amLODIPine 2.5 MG Tabs  Commonly known as: NORVASC     fluconazole 150 MG tablet  Commonly known as: Diflucan     losartan 50 MG Tabs  Commonly known as: COZAAR              Allergies  No Known Allergies    DIET  Orders Placed This Encounter    Procedures    Diet Order Diet: Level 6 - Soft and Bite Sized; Liquid level: Level 2 - Mildly Thick; Tray Modifications (optional): SLP - Deliver to Nursing Station     Standing Status:   Standing     Number of Occurrences:   1     Order Specific Question:   Diet:     Answer:   Level 6 - Soft and Bite Sized [23]     Order Specific Question:   Liquid level     Answer:   Level 2 - Mildly Thick     Order Specific Question:   Tray Modifications (optional)     Answer:   SLP - Deliver to Nursing Station       ACTIVITY  As tolerated and directed by rehab.  Weight bearing as tolerated    LINES, DRAINS, AND WOUNDS  This is an automated list. Peripheral IVs will be removed prior to discharge.  Peripheral IV 02/27/23 18 G Left Antecubital (Active)   Site Assessment Clean;Dry;Intact 03/04/23 0715   Dressing Type Transparent 03/04/23 0715   Line Status Scrubbed the hub prior to access;Flushed;Saline locked 03/04/23 0715   Dressing Status Clean;Dry;Intact 03/04/23 0715   Dressing Intervention N/A 03/03/23 2000   Dressing Change Due 03/04/23 03/03/23 0800   Date Primary Tubing Changed 02/27/23 03/01/23 2100   NEXT Primary Tubing Change  03/04/23 03/01/23 2100   Date IV Connector(s) Changed 02/27/23 03/01/23 2100   Infiltration Grading ([facility]) 0 03/04/23 0715   Phlebitis Scale ([facility] Only) 0 03/04/23 0715     External Urinary Catheter (Female Wick) (Active)   Collection Container Suction container 03/04/23 0715   Suction Level (Female Wick Catheter) 60 mmHg 03/04/23 0715   Output (mL) 200 mL 03/02/23 1200         Peripheral IV 02/27/23 18 G Left Antecubital (Active)   Site Assessment Clean;Dry;Intact 03/04/23 0715   Dressing Type Transparent 03/04/23 0715   Line Status Scrubbed the hub prior to access;Flushed;Saline locked 03/04/23 0715   Dressing Status Clean;Dry;Intact 03/04/23 0715   Dressing Intervention N/A 03/03/23 2000   Dressing Change Due 03/04/23 03/03/23 0800   Date Primary Tubing Changed 02/27/23 03/01/23 2100    NEXT Primary Tubing Change  03/04/23 03/01/23 2100   Date IV Connector(s) Changed 02/27/23 03/01/23 2100   Infiltration Grading ([facility]) 0 03/04/23 0715   Phlebitis Scale ([facility]) 0 03/04/23 0715               MENTAL STATUS ON TRANSFER      Alert, oriented to self x1, pleasant       CONSULTATIONS  Neurology  Physiatry  Palliative care    PROCEDURES  none    LABORATORY  Lab Results   Component Value Date    SODIUM 136 03/02/2023    POTASSIUM 4.5 03/02/2023    CHLORIDE 106 03/02/2023    CO2 19 (L) 03/02/2023    GLUCOSE 136 (H) 03/02/2023    BUN 23 (H) 03/02/2023    CREATININE 1.24 03/02/2023        Lab Results   Component Value Date    WBC 8.6 03/02/2023    HEMOGLOBIN 15.7 03/02/2023    HEMATOCRIT 47.7 (H) 03/02/2023    PLATELETCT 161 (L) 03/02/2023        Total time of the discharge process exceeds 34 minutes.

## 2023-03-04 NOTE — DISCHARGE PLANNING
Agency/Facility Name: [facility]  Spoke To: Kitty  Outcome: Transport has been accepted for transport to [facility] today at 1300 via [facility].     1036  Agency/Facility Name: [facility]  Spoke To: Rose  Outcome: MTM to call [facility] with insurance auth.

## 2023-03-04 NOTE — DISCHARGE PLANNING
Agency/Facility Name: [facility]  Spoke To: Georgina   Outcome: Per Georgina pt needs an LOC done prior to transportation to facility. DPA to inform CM.     RN CM Notified

## 2023-03-07 NOTE — DISCHARGE PLANNING
LOC completed; LOC [phone]    DOROTEO called Marie @ [facility] & informed her of above.

## 2023-04-26 ENCOUNTER — HOSPITAL ENCOUNTER (OUTPATIENT)
Facility: MEDICAL CENTER | Age: 83
End: 2023-04-26
Payer: COMMERCIAL

## 2023-04-26 LAB
APPEARANCE UR: ABNORMAL
BACTERIA #/AREA URNS HPF: ABNORMAL /HPF
BILIRUB UR QL STRIP.AUTO: NEGATIVE
COLOR UR: YELLOW
EPI CELLS #/AREA URNS HPF: NEGATIVE /HPF
GLUCOSE UR STRIP.AUTO-MCNC: NEGATIVE MG/DL
HYALINE CASTS #/AREA URNS LPF: ABNORMAL /LPF
KETONES UR STRIP.AUTO-MCNC: NEGATIVE MG/DL
LEUKOCYTE ESTERASE UR QL STRIP.AUTO: ABNORMAL
MICRO URNS: ABNORMAL
NITRITE UR QL STRIP.AUTO: POSITIVE
PH UR STRIP.AUTO: 5 [PH] (ref 5–8)
PROT UR QL STRIP: NEGATIVE MG/DL
RBC # URNS HPF: ABNORMAL /HPF
RBC UR QL AUTO: ABNORMAL
SP GR UR STRIP.AUTO: 1.01
UROBILINOGEN UR STRIP.AUTO-MCNC: 0.2 MG/DL
WBC #/AREA URNS HPF: ABNORMAL /HPF

## 2023-04-28 LAB
BACTERIA UR CULT: ABNORMAL
BACTERIA UR CULT: ABNORMAL
SIGNIFICANT IND 70042: ABNORMAL
SITE SITE: ABNORMAL
SOURCE SOURCE: ABNORMAL

## 2023-08-23 ENCOUNTER — APPOINTMENT (OUTPATIENT)
Dept: RADIOLOGY | Facility: MEDICAL CENTER | Age: 83
End: 2023-08-23
Attending: EMERGENCY MEDICINE
Payer: MEDICARE

## 2023-08-23 ENCOUNTER — HOSPITAL ENCOUNTER (EMERGENCY)
Facility: MEDICAL CENTER | Age: 83
End: 2023-08-23
Attending: EMERGENCY MEDICINE
Payer: MEDICARE

## 2023-08-23 VITALS
SYSTOLIC BLOOD PRESSURE: 160 MMHG | TEMPERATURE: 97.4 F | HEIGHT: 59 IN | RESPIRATION RATE: 16 BRPM | WEIGHT: 131.39 LBS | DIASTOLIC BLOOD PRESSURE: 86 MMHG | OXYGEN SATURATION: 92 % | HEART RATE: 68 BPM | BODY MASS INDEX: 26.49 KG/M2

## 2023-08-23 DIAGNOSIS — R74.8 ELEVATED ALKALINE PHOSPHATASE LEVEL: ICD-10-CM

## 2023-08-23 DIAGNOSIS — R10.13 EPIGASTRIC PAIN: ICD-10-CM

## 2023-08-23 DIAGNOSIS — R11.2 NAUSEA AND VOMITING, UNSPECIFIED VOMITING TYPE: ICD-10-CM

## 2023-08-23 LAB
ALBUMIN SERPL BCP-MCNC: 4.4 G/DL (ref 3.2–4.9)
ALBUMIN/GLOB SERPL: 1.6 G/DL
ALP SERPL-CCNC: 173 U/L (ref 30–99)
ALT SERPL-CCNC: 10 U/L (ref 2–50)
ANION GAP SERPL CALC-SCNC: 13 MMOL/L (ref 7–16)
APTT PPP: 30.2 SEC (ref 24.7–36)
AST SERPL-CCNC: 17 U/L (ref 12–45)
BASOPHILS # BLD AUTO: 0.4 % (ref 0–1.8)
BASOPHILS # BLD: 0.03 K/UL (ref 0–0.12)
BILIRUB SERPL-MCNC: 0.6 MG/DL (ref 0.1–1.5)
BUN SERPL-MCNC: 21 MG/DL (ref 8–22)
CALCIUM ALBUM COR SERPL-MCNC: 9.7 MG/DL (ref 8.5–10.5)
CALCIUM SERPL-MCNC: 10 MG/DL (ref 8.5–10.5)
CHLORIDE SERPL-SCNC: 105 MMOL/L (ref 96–112)
CO2 SERPL-SCNC: 22 MMOL/L (ref 20–33)
CREAT SERPL-MCNC: 1.14 MG/DL (ref 0.5–1.4)
EKG IMPRESSION: NORMAL
EOSINOPHIL # BLD AUTO: 0.05 K/UL (ref 0–0.51)
EOSINOPHIL NFR BLD: 0.7 % (ref 0–6.9)
ERYTHROCYTE [DISTWIDTH] IN BLOOD BY AUTOMATED COUNT: 43.8 FL (ref 35.9–50)
GFR SERPLBLD CREATININE-BSD FMLA CKD-EPI: 48 ML/MIN/1.73 M 2
GLOBULIN SER CALC-MCNC: 2.7 G/DL (ref 1.9–3.5)
GLUCOSE SERPL-MCNC: 132 MG/DL (ref 65–99)
HCT VFR BLD AUTO: 41.1 % (ref 37–47)
HGB BLD-MCNC: 13.6 G/DL (ref 12–16)
IMM GRANULOCYTES # BLD AUTO: 0.02 K/UL (ref 0–0.11)
IMM GRANULOCYTES NFR BLD AUTO: 0.3 % (ref 0–0.9)
INR PPP: 1.18 (ref 0.87–1.13)
LIPASE SERPL-CCNC: 41 U/L (ref 11–82)
LYMPHOCYTES # BLD AUTO: 0.78 K/UL (ref 1–4.8)
LYMPHOCYTES NFR BLD: 10.7 % (ref 22–41)
MCH RBC QN AUTO: 29.8 PG (ref 27–33)
MCHC RBC AUTO-ENTMCNC: 33.1 G/DL (ref 32.2–35.5)
MCV RBC AUTO: 89.9 FL (ref 81.4–97.8)
MONOCYTES # BLD AUTO: 0.32 K/UL (ref 0–0.85)
MONOCYTES NFR BLD AUTO: 4.4 % (ref 0–13.4)
NEUTROPHILS # BLD AUTO: 6.1 K/UL (ref 1.82–7.42)
NEUTROPHILS NFR BLD: 83.5 % (ref 44–72)
NRBC # BLD AUTO: 0 K/UL
NRBC BLD-RTO: 0 /100 WBC (ref 0–0.2)
PLATELET # BLD AUTO: 174 K/UL (ref 164–446)
PMV BLD AUTO: 9 FL (ref 9–12.9)
POTASSIUM SERPL-SCNC: 4.2 MMOL/L (ref 3.6–5.5)
PROT SERPL-MCNC: 7.1 G/DL (ref 6–8.2)
PROTHROMBIN TIME: 15.2 SEC (ref 12–14.6)
RBC # BLD AUTO: 4.57 M/UL (ref 4.2–5.4)
SODIUM SERPL-SCNC: 140 MMOL/L (ref 135–145)
WBC # BLD AUTO: 7.3 K/UL (ref 4.8–10.8)

## 2023-08-23 PROCEDURE — 85610 PROTHROMBIN TIME: CPT

## 2023-08-23 PROCEDURE — 36415 COLL VENOUS BLD VENIPUNCTURE: CPT

## 2023-08-23 PROCEDURE — 85025 COMPLETE CBC W/AUTO DIFF WBC: CPT

## 2023-08-23 PROCEDURE — 85730 THROMBOPLASTIN TIME PARTIAL: CPT

## 2023-08-23 PROCEDURE — 96374 THER/PROPH/DIAG INJ IV PUSH: CPT

## 2023-08-23 PROCEDURE — 93005 ELECTROCARDIOGRAM TRACING: CPT

## 2023-08-23 PROCEDURE — 93005 ELECTROCARDIOGRAM TRACING: CPT | Performed by: EMERGENCY MEDICINE

## 2023-08-23 PROCEDURE — 70450 CT HEAD/BRAIN W/O DYE: CPT

## 2023-08-23 PROCEDURE — 700105 HCHG RX REV CODE 258: Mod: UD | Performed by: EMERGENCY MEDICINE

## 2023-08-23 PROCEDURE — 700111 HCHG RX REV CODE 636 W/ 250 OVERRIDE (IP): Mod: JZ,UD | Performed by: EMERGENCY MEDICINE

## 2023-08-23 PROCEDURE — 83690 ASSAY OF LIPASE: CPT

## 2023-08-23 PROCEDURE — 80053 COMPREHEN METABOLIC PANEL: CPT

## 2023-08-23 PROCEDURE — 99285 EMERGENCY DEPT VISIT HI MDM: CPT

## 2023-08-23 RX ORDER — SODIUM CHLORIDE 9 MG/ML
1000 INJECTION, SOLUTION INTRAVENOUS ONCE
Status: COMPLETED | OUTPATIENT
Start: 2023-08-23 | End: 2023-08-23

## 2023-08-23 RX ORDER — ONDANSETRON 4 MG/1
4 TABLET, ORALLY DISINTEGRATING ORAL EVERY 8 HOURS PRN
Qty: 10 TABLET | Refills: 0 | Status: SHIPPED | OUTPATIENT
Start: 2023-08-23

## 2023-08-23 RX ORDER — ONDANSETRON 2 MG/ML
4 INJECTION INTRAMUSCULAR; INTRAVENOUS ONCE
Status: COMPLETED | OUTPATIENT
Start: 2023-08-23 | End: 2023-08-23

## 2023-08-23 RX ADMIN — SODIUM CHLORIDE 1000 ML: 9 INJECTION, SOLUTION INTRAVENOUS at 20:46

## 2023-08-23 RX ADMIN — ONDANSETRON 4 MG: 2 INJECTION INTRAMUSCULAR; INTRAVENOUS at 20:45

## 2023-08-23 ASSESSMENT — FIBROSIS 4 INDEX: FIB4 SCORE: 3.19

## 2023-08-24 LAB — BLOOD CULTURE HOLD CXBCH: NORMAL

## 2023-08-24 NOTE — ED TRIAGE NOTES
Chief Complaint   Patient presents with    N/V     Pt has vomited 2x today.    Pt BIBA for above complaint from SNF.  Pt reports headache and abdominal pain.  Pt currently A+Ox1, with a history of dementia.  [place] citizen speaking only,  used.

## 2023-08-24 NOTE — ED PROVIDER NOTES
"  ER Provider Note    Scribed for Jordon Jaime M.D. by Shala Ferrara. 8/23/2023   8:34 PM    Primary Care Provider: Braden Jennings M.D.    CHIEF COMPLAINT  Chief Complaint   Patient presents with    N/V     Pt has vomited 2x today.      EXTERNAL RECORDS REVIEWED  Outpatient Notes: The patient was seen here in February 2023 with a spontaneous intracranial hemorrhage.    HPI/ROS  LIMITATION TO HISTORY   Select: Dementia  OUTSIDE HISTORIAN(S):  Family granddaughter    Janine Torrez is a 83 y.o. female who presents to the ED for evaluation of nausea and vomiting onset today. She reports associated headache. Per patient, she had epigastric pain, but that her pain has now resolved. Per family, the patient has a history of stroke, head bleed, dementia and hypertension.    PAST MEDICAL HISTORY  Past Medical History:   Diagnosis Date    Hypertension        SURGICAL HISTORY  Past Surgical History:   Procedure Laterality Date    CATARACT PHACO WITH IOL Left 8/13/2018    Procedure: CATARACT PHACO WITH IOL;  Surgeon: Edwardo Saeed M.D.;  Location: SURGERY SAME DAY [facility];  Service: Ophthalmology       FAMILY HISTORY  None noted    SOCIAL HISTORY   reports that she has never smoked. She has never used smokeless tobacco. She reports that she does not drink alcohol and does not use drugs.    CURRENT MEDICATIONS  Previous Medications    HYDROCORTISONE (ANUSOL-HC) 25 MG SUPPOS    Insert 1 Suppository into the rectum every 12 hours.    HYDROCORTISONE 1 % CREAM    Apply 1 Application topically 2 times a day.    QUETIAPINE (SEROQUEL) 25 MG TAB    Take 25 mg by mouth every day.       ALLERGIES  No Known Allergies     PHYSICAL EXAM  BP (!) 152/102   Pulse 76   Temp (!) 35.6 °C (96.1 °F) (Oral)   Resp 16   Ht 1.499 m (4' 11.02\")   Wt 59.6 kg (131 lb 6.3 oz)   SpO2 93%   BMI 26.52 kg/m²      Nursing note and vitals reviewed.  Constitutional: Well-developed and well-nourished. No distress.   HENT: Head is " normocephalic and atraumatic. Oropharynx is clear and moist without exudate or erythema.   Eyes: Pupils are equal, round, and reactive to light. Conjunctiva are normal.   Cardiovascular: Normal rate and regular rhythm. No murmur heard. Normal radial pulses.  Pulmonary/Chest: Breath sounds normal. No wheezes or rales.   Abdominal: Soft and Mild tenderness diffusely across the upper abdomen. No distention    Musculoskeletal: Extremities exhibit normal range of motion without edema or tenderness.   Neurological: Awake, alert and oriented to person, place, and time. No focal deficits noted.  Skin: Skin is warm and dry. No rash.   Psychiatric: Normal mood and affect. Appropriate for clinical situation    DIAGNOSTIC STUDIES    Labs:   Results for orders placed or performed during the hospital encounter of 08/23/23   CBC with Differential   Result Value Ref Range    WBC 7.3 4.8 - 10.8 K/uL    RBC 4.57 4.20 - 5.40 M/uL    Hemoglobin 13.6 12.0 - 16.0 g/dL    Hematocrit 41.1 37.0 - 47.0 %    MCV 89.9 81.4 - 97.8 fL    MCH 29.8 27.0 - 33.0 pg    MCHC 33.1 32.2 - 35.5 g/dL    RDW 43.8 35.9 - 50.0 fL    Platelet Count 174 164 - 446 K/uL    MPV 9.0 9.0 - 12.9 fL    Neutrophils-Polys 83.50 (H) 44.00 - 72.00 %    Lymphocytes 10.70 (L) 22.00 - 41.00 %    Monocytes 4.40 0.00 - 13.40 %    Eosinophils 0.70 0.00 - 6.90 %    Basophils 0.40 0.00 - 1.80 %    Immature Granulocytes 0.30 0.00 - 0.90 %    Nucleated RBC 0.00 0.00 - 0.20 /100 WBC    Neutrophils (Absolute) 6.10 1.82 - 7.42 K/uL    Lymphs (Absolute) 0.78 (L) 1.00 - 4.80 K/uL    Monos (Absolute) 0.32 0.00 - 0.85 K/uL    Eos (Absolute) 0.05 0.00 - 0.51 K/uL    Baso (Absolute) 0.03 0.00 - 0.12 K/uL    Immature Granulocytes (abs) 0.02 0.00 - 0.11 K/uL    NRBC (Absolute) 0.00 K/uL   Complete Metabolic Panel   Result Value Ref Range    Sodium 140 135 - 145 mmol/L    Potassium 4.2 3.6 - 5.5 mmol/L    Chloride 105 96 - 112 mmol/L    Co2 22 20 - 33 mmol/L    Anion Gap 13.0 7.0 - 16.0     Glucose 132 (H) 65 - 99 mg/dL    Bun 21 8 - 22 mg/dL    Creatinine 1.14 0.50 - 1.40 mg/dL    Calcium 10.0 8.5 - 10.5 mg/dL    Correct Calcium 9.7 8.5 - 10.5 mg/dL    AST(SGOT) 17 12 - 45 U/L    ALT(SGPT) 10 2 - 50 U/L    Alkaline Phosphatase 173 (H) 30 - 99 U/L    Total Bilirubin 0.6 0.1 - 1.5 mg/dL    Albumin 4.4 3.2 - 4.9 g/dL    Total Protein 7.1 6.0 - 8.2 g/dL    Globulin 2.7 1.9 - 3.5 g/dL    A-G Ratio 1.6 g/dL   Lipase   Result Value Ref Range    Lipase 41 11 - 82 U/L   PROTHROMBIN TIME (INR)   Result Value Ref Range    PT 15.2 (H) 12.0 - 14.6 sec    INR 1.18 (H) 0.87 - 1.13   APTT   Result Value Ref Range    APTT 30.2 24.7 - 36.0 sec   ESTIMATED GFR   Result Value Ref Range    GFR (CKD-EPI) 48 (A) >60 mL/min/1.73 m 2   EKG   Result Value Ref Range    Report       [facility] Emergency Dept.    Test Date:  2023  Pt Name:    JUN BRICEÑO       Department: ER  MRN:        [id number]                      Room:       [facility]  Gender:     Female                       Technician: [id number]  :        [id number]                   Requested By:ER TRIAGE PROTOCOL  Order #:    [id number]                    Reading MD: DIXIE ARTEAGA MD    Measurements  Intervals                                Axis  Rate:       71                           P:          -18  GA:         183                          QRS:        -36  QRSD:       81                           T:          1  QT:         429  QTc:        467    Interpretive Statements  Sinus rhythm  Left axis deviation  Low voltage, precordial leads  Borderline T wave abnormalities  Compared to ECG 2023 03:12:03  T-wave abnormality now present  Myocardial infarct finding no longer present  Electronically Signed On 2023 21:52:23 PDT by DIXIE ARTEAGA MD         EKG:   I have independently interpreted this EKG as detailed above.     Radiology:   This attending emergency physician has independently interpreted the diagnostic imaging  associated with this visit and is awaiting the final reading from the radiologist.   Preliminary interpretation is a follows: CT head shows no intracranial hemorrhage.    Radiologist interpretation:   CT-HEAD W/O   Final Result         1.  No acute intracranial abnormality is identified, there are nonspecific white matter changes, commonly associated with small vessel ischemic disease.  Associated mild cerebral atrophy is noted.   2.  Atherosclerosis.              INITIAL ASSESSMENT AND PLAN    8:34 PM - Patient was evaluated at bedside for evaluation of a headache and nausea and vomiting. Ordered for Ct-Head without, INR, APTT, CBC with differential, CMP, Lipase, UA and EKG to evaluate. Patient verbalizes understanding and support with my plan of care.  Differential diagnoses include but not limited to: viral syndrome, pancreatitis, cholecystitis, dehydration vs electrolyte abnormality.    ED Observation Status? Yes; I am placing the patient in to an observation status due to a diagnostic uncertainty as well as therapeutic intensity. Patient placed in observation status at 8:35 PM, 8/23/2023.     Observation plan is as follows: Patient's nausea will be treated and she will be monitored while awaiting diagnostic studies.     Upon Reevaluation, the patient's condition has: Improved; and will be discharged.    Patient discharged from ED Observation status at 10:01 PM (Time) 8/23/2023 (Date).      COURSE AND MEDICAL DECISION MAKING    10:01 PM - The patient has a normal white blood cell count. Alkaline phosphatase is elevated which is similar to prior values. Ct shows no intercranial hemorrhage  symptomatically improved. Abdominal pain is resolved work up reassuring. Ready for discharge home.     10:19 PM - Patient was seen at bedside. I updated the patient's family on the plan of care. She states she is feeling better and is ready to go home. Patient verbalizes understanding and agreement to this plan of care.     She  presents today with vomiting and a headache.  She has a history of intracranial hemorrhage.  CT scan today shows no intracranial hemorrhage.  This is reassuring.  Symptoms have resolved.  Feeling much better.  Has a benign abdominal exam.    HYDRATION: Based on the patient's presentation of Acute Vomiting the patient was given IV fluids. IV Hydration was used because oral hydration was not adequate alone. Upon recheck following hydration, the patient was improved.    The patient was treated with Zofran for nausea.  Placed on a cardiac monitor to monitor for any arrhythmia associated with Zofran and QT prolongation.    DISPOSITION AND DISCUSSIONS    I have discussed management of the patient with the following physicians and SHANA's:  None noted    Discussion of management with other QHP or appropriate source(s): None     Escalation of care considered, and ultimately not performed: acute inpatient care management, however at this time, the patient is most appropriate for outpatient management.    Barriers to care at this time, including but not limited to:  None noted .     Decision tools and prescription drugs considered including, but not limited to: Antibiotics I considered prescribing antibiotics, however I have not identified a bacterial source of infection.    The patient will return for new or worsening symptoms and is stable at the time of discharge.    DISPOSITION:  Patient will be discharged home in stable condition.    FOLLOW UP:  Braden Jennings M.D.  [address] [place] [id number] [phone]    Schedule an appointment as soon as possible for a visit       [facility], Emergency Dept  [address] [place] [id number] [phone]    If symptoms worsen      OUTPATIENT MEDICATIONS:  New Prescriptions    ONDANSETRON (ZOFRAN ODT) 4 MG TABLET DISPERSIBLE    Take 1 Tablet by mouth every 8 hours as needed for Nausea/Vomiting.     FINAL DIAGNOSIS  1. Nausea and vomiting,  unspecified vomiting type    2. Epigastric pain    3. Elevated alkaline phosphatase level         IShala (Scribe), am scribing for, and in the presence of, Jordon Jaime M.D..    Electronically signed by: Shala Ferrara (Scribe), 8/23/2023    Jordon DOBBINS M.D. personally performed the services described in this documentation, as scribed by Shala Ferrara in my presence, and it is both accurate and complete.      The note accurately reflects work and decisions made by me.  Jordon Jaime M.D.  8/23/2023  11:48 PM

## 2023-08-24 NOTE — ED NOTES
"Pt discharged to SNF with GELACIO  Pt at baseline A+Ox1 on RA. IV discontinued and gauze placed, pt in possession of belongings.  Pt provided discharge education and information pertaining to medications and follow up appointments.  Pt received copy of discharge instructions. Encouraged to follow up with PCP. BP (!) 160/86   Pulse 68   Temp 36.3 °C (97.4 °F) (Temporal)   Resp 16   Ht 1.499 m (4' 11.02\")   Wt 59.6 kg (131 lb 6.3 oz)   SpO2 92%   BMI 26.52 kg/m²      "

## 2023-08-24 NOTE — ED NOTES
Grand daughter at bedside.  Pt unable to remember her name.  Grand daughter reports this is not her baseline.

## 2023-08-24 NOTE — DISCHARGE PLANNING
Medical Social Work     MORIS received a text from the RN requesting SW assistance with [facility] transport back to [facility]. MORIS called [facility] and spoke with MERVIN Andino and advised her we are setting up transport for the pt to get back to there facility.  Thu stated she will be waiting for the pt.     MORIS faxed a PCS form to [facility] and set up transport with Gia for 2330. RN aware of transport time.

## 2023-08-24 NOTE — ED NOTES
GELACIO on scene to transport pt to SNF.  Bedside report given to EMT.  All questions answered.  PIV removed.

## 2023-08-28 ENCOUNTER — TELEPHONE (OUTPATIENT)
Dept: NEUROLOGY | Facility: MEDICAL CENTER | Age: 83
End: 2023-08-28
Payer: MEDICARE

## 2023-08-28 NOTE — TELEPHONE ENCOUNTER
NEUROLOGY PATIENT PRE-VISIT PLANNING     Patient was NOT contacted to complete PVP.  Note: Patient will not be contacted if there is no indication to call.     Patient Appointment is scheduled as: New Patient     Is visit type and length scheduled correctly? Yes    [facility] Patient Demographics? Yes    3.   Is referral attached to visit? Yes    4. Were records received from referring provider? Yes    4. Patient was NOT contacted to have someone accompany them to visit.     5. Is this appointment scheduled as a Hospital Follow-Up?  No    6. Does the patient require any pre procedure or post procedure follow up? No    7. If any orders were placed at last visit or intended to be done for this visit do we have Results/Consult Notes? No  Labs - Labs ordered, completed on 8/23/2023 and results are in chart.  Imaging - Imaging ordered, completed and results are in chart.  Referrals - No referrals were ordered at last office visit.  Note: If patient appointment is for lab or imaging review and patient did not complete the studies, check with provider if OK to reschedule patient until completed.    8. If patient appointment is for Botox - is order pended for provider? N/A

## 2023-09-01 ENCOUNTER — APPOINTMENT (OUTPATIENT)
Dept: NEUROLOGY | Facility: MEDICAL CENTER | Age: 83
End: 2023-09-01
Attending: PSYCHIATRY & NEUROLOGY
Payer: MEDICARE

## 2023-09-01 NOTE — PROGRESS NOTES
No chief complaint on file.      History of present illness:  Janine Torrez 83 y.o. female with history of left thalamic ICH in   2/2023.     Past medical history:   Past Medical History:   Diagnosis Date    Hypertension        Past surgical history:   Past Surgical History:   Procedure Laterality Date    CATARACT PHACO WITH IOL Left 8/13/2018    Procedure: CATARACT PHACO WITH IOL;  Surgeon: Edwardo Saeed M.D.;  Location: SURGERY SAME DAY [facility];  Service: Ophthalmology       Family history:   No family history on file.    Social history:   Social History     Socioeconomic History    Marital status:      Spouse name: Not on file    Number of children: Not on file    Years of education: Not on file    Highest education level: Not on file   Occupational History    Not on file   Tobacco Use    Smoking status: Never    Smokeless tobacco: Never   Substance and Sexual Activity    Alcohol use: No    Drug use: No    Sexual activity: Not on file   Other Topics Concern    Not on file   Social History Narrative    Not on file     Social Determinants of Health     Financial Resource Strain: Not on file   Food Insecurity: Not on file   Transportation Needs: Not on file   Physical Activity: Not on file   Stress: Not on file   Social Connections: Not on file   Intimate Partner Violence: Not on file   Housing Stability: Not on file       Current medications:   Current Outpatient Medications   Medication    ondansetron (ZOFRAN ODT) 4 MG TABLET DISPERSIBLE    QUEtiapine (SEROQUEL) 25 MG Tab    hydrocortisone 1 % Cream    hydrocortisone (ANUSOL-HC) 25 MG Suppos     No current facility-administered medications for this visit.       Medication Allergy:  No Known Allergies    Physical examination:   There were no vitals filed for this visit.  Neurological Exam    Labs:  I reviewed the following labs personally:  ***    Imaging:   3/3/23 MRI BRAIN   IMPRESSION:     1.  There is an approximately 22 x 11 x 10 mm  sized subacute hemorrhage in the left thalamus/left posterior limb of the internal capsule. There is no abnormal nodular enhancement or flow voids to suggest any obvious underlying neoplasm or vascular   malformation.  2.  Subacute infarct in the right posterior frontal lobe.  3.  Chronic infarcts in the right cerebellar hemisphere and right thalamus.  4.  Moderate chronic microvascular ischemic disease.  5.  Moderate cerebral volume loss.    ASSESSMENT AND PLAN:  Problem List Items Addressed This Visit    None      There are no diagnoses linked to this encounter.    FOLLOW-UP:   No follow-ups on file.    Total time spent for the day for this patient unrelated to procedure time is: *** minutes. I spent *** minutes in face to face time and I spent *** minutes pre-charting and *** minutes in post-visit documentation.      KEYONA SinghO.  [facility]

## 2023-10-18 ENCOUNTER — TELEPHONE (OUTPATIENT)
Dept: HEALTH INFORMATION MANAGEMENT | Facility: OTHER | Age: 83
End: 2023-10-18
Payer: MEDICARE

## 2023-10-19 ENCOUNTER — TELEPHONE (OUTPATIENT)
Dept: NEUROLOGY | Facility: MEDICAL CENTER | Age: 83
End: 2023-10-19
Payer: MEDICARE

## 2023-10-19 NOTE — TELEPHONE ENCOUNTER
Janine was scheduled to see Dr Morin on 10/17/23 and was scheduled as [facility]. The patient was dropped off and had no idea why she was here or what she was here for. She had stated that she was not the one who needed to be seen. Viki had cancelled the appointment as she was unable to complete the appointment. Viki Is wanting to know if you could see this patient as she has some form of Dementia and is in a skilled nursing facility. She will definitely need to be accompanied at the appointment. Please let me know if and when you would like me to get her scheduled.

## 2023-10-23 ENCOUNTER — TELEPHONE (OUTPATIENT)
Dept: NEUROLOGY | Facility: MEDICAL CENTER | Age: 83
End: 2023-10-23
Payer: MEDICARE

## 2023-10-23 NOTE — TELEPHONE ENCOUNTER
Called patient's nursing facility to get her scheduled with DR Krueger and let them know that someone will need to be at the appointment the entire time. They said they were going to try to contact her family members to see if they will be able to go to the appointment with her and they would call me back once they know for sure to get her scheduled. As of 10/23/23 we still have not received a call back

## 2023-11-03 ENCOUNTER — APPOINTMENT (OUTPATIENT)
Dept: NEUROLOGY | Facility: MEDICAL CENTER | Age: 83
End: 2023-11-03
Attending: PSYCHIATRY & NEUROLOGY
Payer: MEDICARE

## 2023-11-17 ENCOUNTER — APPOINTMENT (OUTPATIENT)
Dept: LAB | Facility: MEDICAL CENTER | Age: 83
End: 2023-11-17
Payer: MEDICARE

## 2023-11-17 ENCOUNTER — OFFICE VISIT (OUTPATIENT)
Dept: NEUROLOGY | Facility: MEDICAL CENTER | Age: 83
End: 2023-11-17
Attending: PSYCHIATRY & NEUROLOGY
Payer: MEDICARE

## 2023-11-17 VITALS
OXYGEN SATURATION: 96 % | HEIGHT: 59 IN | HEART RATE: 55 BPM | BODY MASS INDEX: 27.47 KG/M2 | SYSTOLIC BLOOD PRESSURE: 114 MMHG | WEIGHT: 136.24 LBS | TEMPERATURE: 97.3 F | DIASTOLIC BLOOD PRESSURE: 64 MMHG

## 2023-11-17 DIAGNOSIS — R26.89 NEED FOR ASSISTANCE DUE TO UNSTEADY GAIT: ICD-10-CM

## 2023-11-17 DIAGNOSIS — F03.B18 MODERATE DEMENTIA WITH OTHER BEHAVIORAL DISTURBANCE, UNSPECIFIED DEMENTIA TYPE (HCC): ICD-10-CM

## 2023-11-17 DIAGNOSIS — I61.9 HEMORRHAGIC STROKE (HCC): ICD-10-CM

## 2023-11-17 PROBLEM — I77.810 ASCENDING AORTA DILATATION (HCC): Status: ACTIVE | Noted: 2021-10-20

## 2023-11-17 PROBLEM — I10 BENIGN ESSENTIAL HYPERTENSION: Status: ACTIVE | Noted: 2019-05-30

## 2023-11-17 PROBLEM — I63.9 STROKE (CEREBRUM) (HCC): Status: ACTIVE | Noted: 2021-10-20

## 2023-11-17 PROCEDURE — 3078F DIAST BP <80 MM HG: CPT | Performed by: PSYCHIATRY & NEUROLOGY

## 2023-11-17 PROCEDURE — 99212 OFFICE O/P EST SF 10 MIN: CPT | Performed by: PSYCHIATRY & NEUROLOGY

## 2023-11-17 PROCEDURE — 3074F SYST BP LT 130 MM HG: CPT | Performed by: PSYCHIATRY & NEUROLOGY

## 2023-11-17 PROCEDURE — 99203 OFFICE O/P NEW LOW 30 MIN: CPT | Performed by: PSYCHIATRY & NEUROLOGY

## 2023-11-17 RX ORDER — CARVEDILOL 3.12 MG/1
3.12 TABLET ORAL 2 TIMES DAILY
COMMUNITY
Start: 2023-10-25

## 2023-11-17 RX ORDER — FAMOTIDINE 20 MG/1
20 TABLET, FILM COATED ORAL 2 TIMES DAILY
COMMUNITY
Start: 2023-09-19

## 2023-11-17 RX ORDER — ATORVASTATIN CALCIUM 20 MG/1
20 TABLET, FILM COATED ORAL DAILY
COMMUNITY
Start: 2023-10-13

## 2023-11-17 ASSESSMENT — MONTREAL COGNITIVE ASSESSMENT (MOCA)
WHAT IS THE VERSION OF MOCA ADMINISTERED: 7.1
WHAT IS THE TOTAL SCORE (OUT OF 30): 3
ADD 1 POINT IF LESS THAN OR EQUAL TO 12 YR EDUCATION LEVEL: 1
4. NAME EACH OF THE THREE ANIMALS SHOWN: 0/3
3. DRAW A CLOCK: CONTOUR, NUMBERS, HANDS: 1/3
7. [VIGILENCE] TAP WHEN HEARING DESIGNATED LETTER: 0/1
1. ALTERNATING TRAIL MAKING: 0/1
ORIENTATION SUBSCORE: 1/6
2. COPY DRAWING: 0/1
DELAYED RECALL SUBSCORE: 0/5
5. MEMORY TRIALS: SECOND TRIAL
6. READ LIST OF DIGITS [FORWARD/BACKWARD]: 0/2
9. REPEAT EACH SENTENCE: 0/2

## 2023-11-17 ASSESSMENT — FIBROSIS 4 INDEX: FIB4 SCORE: 2.5

## 2023-11-17 NOTE — PROGRESS NOTES
"Neuro note: Concern for Dementia    Here with Darling (who has ghada Janine for about 8 months) and  who sees Janine about 3 times a week.    She has a 3rd Grade education in [employer].    She worked in Laundry cleaning clothes.     # is [id number]  (Faroese)    Lives in Nursing Home    Janine Torrez 83 y.o. right handed woman who has typically been alert and oriented x 2 per Darling since Darling has known Janine> typically does not where her daughter in located and has a wander card (she typically she will wonder around the nursing home looking for her daughter who is not in the facility). She often thinks she is going to Judaism and she does not realize she is living in the Nursing Home (not realizing the Nursing Home is her present living arrangement).    Janine often will repeat herself an/or ask the same question over within 15 minutes or so since Darling has known her.    Darling recently asked Janine how old she was and she stated \"42 years old\"    There is no history of porfirio or ongoing delusions or paranoia in the recent months.    She presently denies any headaches, pains of her limbs, visual disturbances such as     She denies falls or near falls in the recent months.    No history of seizure type episodes known to have occurred in adult life.    No history of smoking or alcohol in adult life.    Note from [facility] Hospitalization for stroke reviewed from 2        Family:  Unknown  She was able to tell me she had 2 brothers and was confusing her kids and her brothers.      Patient Active Problem List    Diagnosis Date Noted    Cardiac dysrhythmia 02/28/2023    Hypokalemia 02/28/2023    Hyperglycemia 02/28/2023    Oropharyngeal dysphagia 02/28/2023    Hemorrhagic stroke (HCC) 02/27/2023    Alzheimer's dementia with behavioral disturbance (HCC) 02/27/2023    Other hyperlipidemia 02/27/2023    Benign essential hypertension 06/14/2019    Chronic renal failure 05/30/2019    Thyroglossal duct cyst 05/30/2019    " Memory loss 05/30/2019    Liver cyst 05/30/2019       Past medical history:   Past Medical History:   Diagnosis Date    Hypertension        Past surgical history:   Past Surgical History:   Procedure Laterality Date    CATARACT PHACO WITH IOL Left 8/13/2018    Procedure: CATARACT PHACO WITH IOL;  Surgeon: Edwardo Saeed M.D.;  Location: SURGERY SAME DAY [facility];  Service: Ophthalmology         Social history:   Social History     Socioeconomic History    Marital status:      Spouse name: Not on file    Number of children: Not on file    Years of education: Not on file    Highest education level: Not on file   Occupational History    Not on file   Tobacco Use    Smoking status: Never    Smokeless tobacco: Never   Vaping Use    Vaping Use: Never used   Substance and Sexual Activity    Alcohol use: No    Drug use: No    Sexual activity: Not on file   Other Topics Concern    Not on file   Social History Narrative    Not on file     Social Determinants of Health     Financial Resource Strain: Not on file   Food Insecurity: Not on file   Transportation Needs: Not on file   Physical Activity: Not on file   Stress: Not on file   Social Connections: Not on file   Intimate Partner Violence: Not on file   Housing Stability: Not on file       Family history:   No family history on file.      Current medications:   Current Outpatient Medications   Medication    losartan (COZAAR) 100 MG Tab    amLODIPine (NORVASC) 2.5 MG Tab    hydrOXYzine HCl (ATARAX) 25 MG Tab    hydrocortisone 1 % Cream    hydrocortisone (ANUSOL-HC) 25 MG Suppos    ondansetron (ZOFRAN ODT) 4 MG TABLET DISPERSIBLE    QUEtiapine (SEROQUEL) 25 MG Tab     No current facility-administered medications for this visit.       Medication Allergy:  No Known Allergies        Physical examination:   Vitals:    11/17/23 1244   BP: 114/64   BP Location: Right arm   Patient Position: Sitting   BP Cuff Size: Adult   Pulse: (!) 55   Temp: 36.3 °C (97.3 °F)  "  TempSrc: Temporal   SpO2: 96%   Weight: 61.8 kg (136 lb 3.9 oz)   Height: 1.499 m (4' 11.02\")       Normal cephalic atraumatic.  There is full range of movement around the neck in all directions without restrictions or discrete pain evoked triggers.  No lower extremity edema.      Neurological  Exam:      Crawford Cognitive Assessment (MOCA) Version 7.1    Years of Education: 3rd Grade Education    TOTAL SCORE: 3/30  (to be scanned into the MEDIA section in the E.M.R.)    Crawford Cognitive Assessment (MOCA) Version Number: 7.1   VISUOSPACIAL / EXECUTIVE   Clock Drawin3  Spatial Drawin/1  Cube Drawin/1    NAMING  Namin/3    MEMORY  Memory: Second trial    ATTENTION  Digits: 0/2  Letters: 0/1  Subtraction:      LANGUAGE  Repeat Phrases: 0/2  Fluency:      ABSTRACTION  Abstraction:      DELAYED RECALL  Recall words: 0/5  Category Cue (if applicable):    Multiple Choice Cue (if applicable):     ORIENTATION  Orientation:     Add 1 point if less than or equal to 12 yr education level: 1   MOCA TOTAL SCORE:  3 /30  Biomechanical/Visual Limitations (if applicable):        She could not tell the  year she was born and said in [place] she is 42 years old.    She could not draw a Poarch well nor put any numbers in the Po[place] when I asked her to draw a clock.          Mental status: Awake, alert and fully oriented to person and situation. Normal attention and concentration.  Did not appear/act combative,irritable,anxious,paranoid/delusional or aggressive to or with me.    Speech and language: Speech is fluent without errors, clear, and intact to naming.     Follows 3 step motor commands in sequence without significant delay and correctly.    Cranial nerve exam:  II: Pupils are equally round and reactive to light. Visual fields are intact by confrontation.  III, IV, VI: EOMI, no diplopia, no ptosis.  V: Sensation to light touch is normal over V1-3 distributions bilaterally.  .  VII: Facial movements are " symmetrical. There is no facial droop. .  VIII: Hearing intact to soft speech and finger rub bilaterally  IX: Palate elevates symmetrically, uvula is midline. Dysarthria is not present.  XI: Shoulder shrug are symmetrical and strong.   XII: Tongue protrudes midline.      Motor exam:  Muscle tone is normal in all 4 limbs. and No abnormal movements appreciated.    Muscle strength:    Neck Flexors/Extensors: 5/5       Right  Left  Deltoid   5/5  5/5      Biceps   5/5  5/5  Triceps               5/5  5/5   Wrist extensors 5/5  5/5  Wrist flexors  5/5  5/5     5/5  5/5  Interossei  5/5  5/5  Thenar (APB)  5/5  5/5   Hip flexors  5/5  5/5  Quadriceps  5/5  5/5    Hamstrings  5/5  5/5  Dorsiflexors  5/5  5/5  Plantarflexors  5/5  5/5  Toe extension  5/5  5/5        Reflexes:       Right  Left  Biceps   2/4  2/4  Triceps              2/4  2/4  Brachioradialis 2/4  2/4  Knee jerk  2/4  2/4  Ankle jerk  2/4  2/4     Frontal release signs are absent    bilaterally toes are downgoing to plantar stimulation..    Coordination (finger-to-nose, heel/knee/shin, rapid alternating movements) was normal.     There was no ataxia, no tremors, and no dysmetria.     Station and gait > can not stand without 1 person assist.   No veering or leaning to either side.       Labs and Tests:     NEUROIMAGING:     Narrative & Impression     2/27/2023 1:42 AM     HISTORY/REASON FOR EXAM: Emergency Medical Condition ? Stroke.  Right-sided deficits. History of left thalamic hemorrhage. History of old infarct.     TECHNIQUE/EXAM DESCRIPTION:  CT angiogram of the neck without and with contrast.     Initial precontrast images were obtained from the great vessels through the skull base.  Postcontrast images were obtained of the neck from the great vessels through the skull base following the power injection of nonionic contrast at 5.0 mL/sec.   Thin-section helical images were obtained with overlapping reconstruction interval. Coronal and oblique  multiplanar volume reformats were generated..     80 mL of Omnipaque 350 nonionic contrast was injected intravenously.     Cervical internal carotid artery percent stenosis is calculated using the standard method according to the NASCET criteria wherein a segment of uniform caliber mid or distal cervical internal carotid is used as the reference denominator.     Low dose optimization technique was utilized for this CT exam including automated exposure control and adjustment of the mA and/or kV according to patient size.     COMPARISON:  CTA head same time     FINDINGS:  Aortic arch: conventional branching pattern.     There is atherosclerotic plaque of the aorta.     Right common carotid artery: Patent     Right internal carotid artery: Atherosclerotic plaque without significant stenosis (less than 50%).     Left common carotid artery is patent.     Left internal carotid artery: Atherosclerotic plaque without significant stenosis (less than 50%).     The right vertebral artery is hypoplastic but patent without dissection or stenosis.     The left vertebral artery is patent without dissection or stenosis.     Vertebrobasilar confluence: The vertebrobasilar confluence appears normal.     Lung apices are clear     Soft tissues demonstrate enlarged right lobe of the thyroid gland with heterogeneity. There is bilateral mucosal enhancement in the nasopharynx.     There is multilevel degenerative change throughout the spine.     3D angiographic/MIP images of the vasculature confirm the vascular findings as described above.     IMPRESSION:     1.  Mild bilateral atherosclerosis with less than 50% ICA stenosis.           Exam Ended: 02/27/23  1:55 AM Last Resulted: 02/27/23  2:08 AM             3/3/2023 2:18 PM     HISTORY/REASON FOR EXAM:  New ICH.        TECHNIQUE/EXAM DESCRIPTION:   MRI of the brain without and with contrast.     T1 sagittal, T2 fast spin-echo axial, T1 coronal, FLAIR coronal, diffusion-weighted and  apparent diffusion coefficient (ADC map) axial images were obtained of the whole brain. T1 postcontrast axial and T1 postcontrast coronal images were obtained.     The study was performed on a [employer] Directa 1.5 Talia MRI scanner.     12 mL ProHance contrast was administered intravenously.     COMPARISON:  None.     FINDINGS:  There is an approximately 22 x 10 x 11 mm sized subacute hemorrhage in the thalamus/posterior limb of the internal capsule region. Mild amount of edema is noted surrounding the hemorrhage. There is chronic microhemorrhage in the right thalamus   There is subacute/chronic infarct in the right posterior frontal lobe. Nonspecific T2 hyperintensities are noted in the subcortical and periventricular white matter. There are chronic infarcts in the right cerebellar hemisphere and thalamus.  Moderate   cerebral volume loss is seen. There is no intra-axial space-occupying lesion. The ventricles, cortical sulci and the basal cisterns are prominent consistent with moderate cerebral volume loss. The visualized flow voids of the cerebral vasculature are   unremarkable.  There is no large lesion identified in the expected course of the intracranial portions of the cranial nerves.     The skull bones are unremarkable. There is mucosal thickening in the bilateral maxillary, ethmoid and left side of the sphenoid sinus. The extracranial soft tissue including orbits appear grossly normal.     There is no parenchymal or meningeal contrast enhancement.     IMPRESSION:     1.  There is an approximately 22 x 11 x 10 mm sized subacute hemorrhage in the left thalamus/left posterior limb of the internal capsule. There is no abnormal nodular enhancement or flow voids to suggest any obvious underlying neoplasm or vascular   malformation.  2.  Subacute infarct in the right posterior frontal lobe.  3.  Chronic infarcts in the right cerebellar hemisphere and right thalamus.  4.  Moderate chronic microvascular ischemic  disease.  5.  Moderate cerebral volume loss.           Exam Ended: 03/03/23  2:57 PM Last Resulted: 03/03/23  3:12 PM                 Narrative & Impression     8/23/2023 8:45 PM     HISTORY/REASON FOR EXAM: Headache, h/o ICH     TECHNIQUE/EXAM DESCRIPTION:  CT of the head without contrast.     Sequential axial images were obtained from the vertex to the skull base without contrast.     Up to date radiation dose reduction adjustments have been utilized to meet ALARA standards for radiation dose reduction.     COMPARISON: February 27, 2023     FINDINGS:     There is mild diffuse parenchymal volume loss observed. Periventricular and subcortical white matter low-attenuation changes are seen, most commonly associated with small vessel ischemic disease. Low-density encephalomalacia changes in the right parietal   lobe are seen. There is mild bilateral symmetric ventricular dilatation seen, with appearance likely representing ex vacuo dilatation. No space occupying lesions or areas of acute vascular territory infarctions are identified. There are no abnormal   extra axial fluid collections or extra axial hemorrhage identified.     The visualized paranasal sinuses and mastoid air cells are well aerated bilaterally. No depressed calvarial fractures are identified. The visualized globes and retrobulbar soft tissues appear within normal limits.  Atherosclerotic intracranial   calcifications are seen.     IMPRESSION:        1.  No acute intracranial abnormality is identified, there are nonspecific white matter changes, commonly associated with small vessel ischemic disease.  Associated mild cerebral atrophy is noted.  2.  Atherosclerosis.              Exam Ended: 08/23/23  9:02 PM Last Resulted: 08/23/23  9:09 PM              1.  Cerebral atrophy.     2.  White matter lucencies most consistent with small vessel ischemic change versus demyelination or gliosis.     3. Stable 1.5 cm ovoid area of intraparenchymal hemorrhage in  "the left basal ganglia.     4. Chronic multifocal areas of lacunar type infarction noted in the basal ganglia..     5. Small chronic area of infarction in the right posterior frontal lobe.           Exam Ended: 02/27/23  8:11 AM Last Resulted: 02/27/23  8:22 AM               Impression/Plans/Recommendations:      .1.  Left Basal Hemorrhage- non traumatic in   2/2023 (Head CT)  She has been poorly ambulatory and imbalanced for atleast the last 8 months but can wheel self around per Antionette.     2. Dementia-advanced and likely neurodegenerative in nature with superimposed vascular component.      Degree of Dementia is Moderate to Severe even given her language barrier and low education.    MOCA score of 2/30 today.    Global Deterioration Score today per Antionette of 5-6 range.    Functional Activity Questionnaire score today per Antionette of 29/30    She could not follow 2 step motor commands consistently.    Janine for many months has been wandering and does not typically know the day,date,month or  year.    I do not feel that Janine can make reliable health care decisions for herself at this point.    Her degree of memory loss is significant as is her ability to reason as noted on the MOCA today.    Plans:    A. Consideration of taking baby ASA would be reasonable given the non traumatic nature of small left thalamic stroke in 2023 with prior ischemic stroke of right posterior frontal parietal region.    B. Use of a high intensity statin (atorvastatin - 40 mg) or Rosuvastatin (20 mg  a day) would be reasonable for stroke prevention purposes.    I reviewed with Antionette the pros and cons of using \"A\" and \"B\" above and to reasoning to reduce stroke risk as well as the potential risks of bleeding anywhere in body or within head and risks of myalgias,myopathy and/or rhabdo. Plan was to pursue such medications unless her local primary care doctor feels otherwise at [facility].    C. I do  not feel " another Brain MRI or MRA or Head CT needed at this time.    D. Continues to be in wheelchair> self admitted and known knee pain(s)    There are no features of myelopathy,polyneuropathy,myopathy or parkinsonism at this time.    E. Given the risk factors and side effects associated with Aricept or Namenda the present feeling when discussing with Antionette was to hold off on using such medications.    F. Will check Vitamin B and D levels, BMP, and TSH          I have performed  a history and physical exam and a directed /focused  ROS today.    Total time spent today or this patient's care was 42  minutes  and included reviewing  the diagnostic workup to date (such as labs and imaging as well as interpreting such tests relevant to this patient's neurological condition),  reviewing/obtaining separately obtained history (from patient and Antionette)   for today's neurological problem(s) ,counseling and educating the patient and family member on issues related to cognition/memory and cognitive health factors and documenting  the clinical information in the EMR.    Follow up at this time MERLIN Krueger MD  [facility].   [facility]

## 2023-11-20 ENCOUNTER — HOSPITAL ENCOUNTER (OUTPATIENT)
Facility: MEDICAL CENTER | Age: 83
End: 2023-11-20
Attending: INTERNAL MEDICINE
Payer: MEDICARE

## 2023-11-20 PROCEDURE — 82306 VITAMIN D 25 HYDROXY: CPT

## 2023-11-21 LAB — 25(OH)D3 SERPL-MCNC: 11 NG/ML (ref 30–100)

## 2023-11-22 ENCOUNTER — HOSPITAL ENCOUNTER (OUTPATIENT)
Facility: MEDICAL CENTER | Age: 83
End: 2023-11-22
Attending: INTERNAL MEDICINE
Payer: MEDICARE

## 2023-11-22 PROCEDURE — 84425 ASSAY OF VITAMIN B-1: CPT

## 2023-11-26 LAB — VIT B1 BLD-MCNC: 72 NMOL/L (ref 70–180)

## 2025-02-14 ENCOUNTER — APPOINTMENT (OUTPATIENT)
Dept: RADIOLOGY | Facility: MEDICAL CENTER | Age: 85
End: 2025-02-14
Attending: STUDENT IN AN ORGANIZED HEALTH CARE EDUCATION/TRAINING PROGRAM
Payer: MEDICARE

## 2025-02-14 ENCOUNTER — HOSPITAL ENCOUNTER (EMERGENCY)
Facility: MEDICAL CENTER | Age: 85
End: 2025-02-15
Attending: STUDENT IN AN ORGANIZED HEALTH CARE EDUCATION/TRAINING PROGRAM
Payer: MEDICARE

## 2025-02-14 DIAGNOSIS — K76.89 LIVER CYST: ICD-10-CM

## 2025-02-14 DIAGNOSIS — I95.9 TRANSIENT HYPOTENSION: ICD-10-CM

## 2025-02-14 DIAGNOSIS — R55 SYNCOPE, UNSPECIFIED SYNCOPE TYPE: Primary | ICD-10-CM

## 2025-02-14 LAB
ALBUMIN SERPL BCP-MCNC: 3.4 G/DL (ref 3.2–4.9)
ALBUMIN/GLOB SERPL: 1.3 G/DL
ALP SERPL-CCNC: 111 U/L (ref 30–99)
ALT SERPL-CCNC: 9 U/L (ref 2–50)
ANION GAP SERPL CALC-SCNC: 10 MMOL/L (ref 7–16)
AST SERPL-CCNC: 17 U/L (ref 12–45)
BASOPHILS # BLD AUTO: 0.3 % (ref 0–1.8)
BASOPHILS # BLD: 0.02 K/UL (ref 0–0.12)
BILIRUB SERPL-MCNC: 0.4 MG/DL (ref 0.1–1.5)
BUN SERPL-MCNC: 25 MG/DL (ref 8–22)
CALCIUM ALBUM COR SERPL-MCNC: 9.7 MG/DL (ref 8.5–10.5)
CALCIUM SERPL-MCNC: 9.2 MG/DL (ref 8.5–10.5)
CHLORIDE SERPL-SCNC: 110 MMOL/L (ref 96–112)
CO2 SERPL-SCNC: 20 MMOL/L (ref 20–33)
CREAT SERPL-MCNC: 1.08 MG/DL (ref 0.5–1.4)
EKG IMPRESSION: NORMAL
EOSINOPHIL # BLD AUTO: 0.09 K/UL (ref 0–0.51)
EOSINOPHIL NFR BLD: 1.4 % (ref 0–6.9)
ERYTHROCYTE [DISTWIDTH] IN BLOOD BY AUTOMATED COUNT: 50.2 FL (ref 35.9–50)
FLUAV RNA SPEC QL NAA+PROBE: NEGATIVE
FLUBV RNA SPEC QL NAA+PROBE: NEGATIVE
GFR SERPLBLD CREATININE-BSD FMLA CKD-EPI: 51 ML/MIN/1.73 M 2
GLOBULIN SER CALC-MCNC: 2.6 G/DL (ref 1.9–3.5)
GLUCOSE SERPL-MCNC: 118 MG/DL (ref 65–99)
HCT VFR BLD AUTO: 33.4 % (ref 37–47)
HGB BLD-MCNC: 10.7 G/DL (ref 12–16)
IMM GRANULOCYTES # BLD AUTO: 0.02 K/UL (ref 0–0.11)
IMM GRANULOCYTES NFR BLD AUTO: 0.3 % (ref 0–0.9)
LACTATE SERPL-SCNC: 1.7 MMOL/L (ref 0.5–2)
LIPASE SERPL-CCNC: 51 U/L (ref 11–82)
LYMPHOCYTES # BLD AUTO: 1 K/UL (ref 1–4.8)
LYMPHOCYTES NFR BLD: 15.2 % (ref 22–41)
MCH RBC QN AUTO: 30.4 PG (ref 27–33)
MCHC RBC AUTO-ENTMCNC: 32 G/DL (ref 32.2–35.5)
MCV RBC AUTO: 94.9 FL (ref 81.4–97.8)
MONOCYTES # BLD AUTO: 0.41 K/UL (ref 0–0.85)
MONOCYTES NFR BLD AUTO: 6.2 % (ref 0–13.4)
NEUTROPHILS # BLD AUTO: 5.03 K/UL (ref 1.82–7.42)
NEUTROPHILS NFR BLD: 76.6 % (ref 44–72)
NRBC # BLD AUTO: 0 K/UL
NRBC BLD-RTO: 0 /100 WBC (ref 0–0.2)
PLATELET # BLD AUTO: 160 K/UL (ref 164–446)
PMV BLD AUTO: 8.8 FL (ref 9–12.9)
POTASSIUM SERPL-SCNC: 4.1 MMOL/L (ref 3.6–5.5)
PROT SERPL-MCNC: 6 G/DL (ref 6–8.2)
RBC # BLD AUTO: 3.52 M/UL (ref 4.2–5.4)
RSV RNA SPEC QL NAA+PROBE: NEGATIVE
SARS-COV-2 RNA RESP QL NAA+PROBE: NOTDETECTED
SODIUM SERPL-SCNC: 140 MMOL/L (ref 135–145)
TROPONIN T SERPL-MCNC: 12 NG/L (ref 6–19)
WBC # BLD AUTO: 6.6 K/UL (ref 4.8–10.8)

## 2025-02-14 PROCEDURE — 74177 CT ABD & PELVIS W/CONTRAST: CPT

## 2025-02-14 PROCEDURE — 83605 ASSAY OF LACTIC ACID: CPT

## 2025-02-14 PROCEDURE — 87086 URINE CULTURE/COLONY COUNT: CPT

## 2025-02-14 PROCEDURE — 80053 COMPREHEN METABOLIC PANEL: CPT

## 2025-02-14 PROCEDURE — 71045 X-RAY EXAM CHEST 1 VIEW: CPT

## 2025-02-14 PROCEDURE — 87015 SPECIMEN INFECT AGNT CONCNTJ: CPT

## 2025-02-14 PROCEDURE — 85025 COMPLETE CBC W/AUTO DIFF WBC: CPT

## 2025-02-14 PROCEDURE — 84484 ASSAY OF TROPONIN QUANT: CPT

## 2025-02-14 PROCEDURE — 36415 COLL VENOUS BLD VENIPUNCTURE: CPT

## 2025-02-14 PROCEDURE — 99285 EMERGENCY DEPT VISIT HI MDM: CPT

## 2025-02-14 PROCEDURE — 70450 CT HEAD/BRAIN W/O DYE: CPT

## 2025-02-14 PROCEDURE — 0241U HCHG SARS-COV-2 COVID-19 NFCT DS RESP RNA 4 TRGT ED POC: CPT

## 2025-02-14 PROCEDURE — 87040 BLOOD CULTURE FOR BACTERIA: CPT | Mod: 91

## 2025-02-14 PROCEDURE — 81003 URINALYSIS AUTO W/O SCOPE: CPT

## 2025-02-14 PROCEDURE — 93005 ELECTROCARDIOGRAM TRACING: CPT | Mod: TC | Performed by: STUDENT IN AN ORGANIZED HEALTH CARE EDUCATION/TRAINING PROGRAM

## 2025-02-14 PROCEDURE — 83690 ASSAY OF LIPASE: CPT

## 2025-02-14 PROCEDURE — 700105 HCHG RX REV CODE 258: Mod: UD | Performed by: STUDENT IN AN ORGANIZED HEALTH CARE EDUCATION/TRAINING PROGRAM

## 2025-02-14 RX ORDER — SODIUM CHLORIDE, SODIUM LACTATE, POTASSIUM CHLORIDE, AND CALCIUM CHLORIDE .6; .31; .03; .02 G/100ML; G/100ML; G/100ML; G/100ML
30 INJECTION, SOLUTION INTRAVENOUS ONCE
Status: COMPLETED | OUTPATIENT
Start: 2025-02-14 | End: 2025-02-15

## 2025-02-14 RX ORDER — CEFTRIAXONE 2 G/1
2000 INJECTION, POWDER, FOR SOLUTION INTRAMUSCULAR; INTRAVENOUS ONCE
Status: COMPLETED | OUTPATIENT
Start: 2025-02-14 | End: 2025-02-15

## 2025-02-14 RX ADMIN — SODIUM CHLORIDE, POTASSIUM CHLORIDE, SODIUM LACTATE AND CALCIUM CHLORIDE 1851 ML: 600; 310; 30; 20 INJECTION, SOLUTION INTRAVENOUS at 22:55

## 2025-02-14 ASSESSMENT — FIBROSIS 4 INDEX: FIB4 SCORE: 2.56

## 2025-02-14 ASSESSMENT — LIFESTYLE VARIABLES: DO YOU DRINK ALCOHOL: NO

## 2025-02-15 VITALS
HEART RATE: 60 BPM | BODY MASS INDEX: 27.42 KG/M2 | TEMPERATURE: 97 F | OXYGEN SATURATION: 98 % | SYSTOLIC BLOOD PRESSURE: 121 MMHG | DIASTOLIC BLOOD PRESSURE: 71 MMHG | WEIGHT: 136 LBS | RESPIRATION RATE: 16 BRPM | HEIGHT: 59 IN

## 2025-02-15 LAB
APPEARANCE UR: CLEAR
BILIRUB UR QL STRIP.AUTO: NEGATIVE
COLOR UR: ABNORMAL
GLUCOSE UR STRIP.AUTO-MCNC: NEGATIVE MG/DL
KETONES UR STRIP.AUTO-MCNC: ABNORMAL MG/DL
LEUKOCYTE ESTERASE UR QL STRIP.AUTO: NEGATIVE
MICRO URNS: ABNORMAL
NITRITE UR QL STRIP.AUTO: NEGATIVE
PH UR STRIP.AUTO: 5.5 [PH] (ref 5–8)
PROT UR QL STRIP: NEGATIVE MG/DL
RBC UR QL AUTO: NEGATIVE
SP GR UR STRIP.AUTO: 1.03
UROBILINOGEN UR STRIP.AUTO-MCNC: 1 EU/DL

## 2025-02-15 PROCEDURE — 700111 HCHG RX REV CODE 636 W/ 250 OVERRIDE (IP): Mod: JZ,UD | Performed by: STUDENT IN AN ORGANIZED HEALTH CARE EDUCATION/TRAINING PROGRAM

## 2025-02-15 PROCEDURE — 700117 HCHG RX CONTRAST REV CODE 255: Mod: UD | Performed by: STUDENT IN AN ORGANIZED HEALTH CARE EDUCATION/TRAINING PROGRAM

## 2025-02-15 PROCEDURE — 96374 THER/PROPH/DIAG INJ IV PUSH: CPT

## 2025-02-15 RX ADMIN — CEFTRIAXONE SODIUM 2000 MG: 2 INJECTION, POWDER, FOR SOLUTION INTRAMUSCULAR; INTRAVENOUS at 00:04

## 2025-02-15 RX ADMIN — IOHEXOL 90 ML: 350 INJECTION, SOLUTION INTRAVENOUS at 00:00

## 2025-02-15 NOTE — ED NOTES
Reviewed discharge instructions with pt. Verbalized understanding of instructions. Will follow up as direct. Report called to the RN at [facility] rehabilitation. [facility] transport at bedside to take patient back via gurney.

## 2025-02-15 NOTE — DISCHARGE INSTRUCTIONS
You are seen and evaluated the emergency department for your hypotension.  Here you are at your normal baseline mental status, your labs are largely reassuring, your urine does not show any signs of infection.  Your x-ray does not show enzymes and pneumonia.  Your labs did show that you are mildly dehydrated.  You received some fluids and some empiric antibiotics here with some improvement in your vital signs.  Your CT scan did show that you have some large cyst in your liver that will need to be followed up on an outpatient basis by your primary care physician.  We had shared decision making with yourself, your daughter and at this time you have elected to go home with PCP follow-up.  If you experience any worsening symptoms or other concerning symptoms please return to the emergency department.

## 2025-02-15 NOTE — ED TRIAGE NOTES
"Chief Complaint   Patient presents with    Syncope     Pt had witnessed syncope while sitting down. -HS -LOC +ASA     BIB EMS from [facility]. Nurses at facility states she received her carvedilol at 1930, then had a witness syncopal episode while sitting down.     RN report systolic BP in 70's. 82/56 per EMS. Received 300mL NS.     BP 98/64   Pulse 66   Temp 36.7 °C (98 °F) (Temporal)   Resp 18   Ht 1.499 m (4' 11\")   Wt 61.7 kg (136 lb)   SpO2 93%   BMI 27.47 kg/m²     "

## 2025-02-15 NOTE — ED PROVIDER NOTES
ER Provider Note    Scribed for Rivas Dobbins M.d. by Rose Chappell. 2/14/2025  10:03 PM    Primary Care Provider: Braden Jennings M.D.    CHIEF COMPLAINT   Chief Complaint   Patient presents with    Syncope     Pt had witnessed syncope while sitting down. -HS -LOC +ASA     EXTERNAL RECORDS REVIEWED  Outpatient Notes Review of outpatient neurology note shows that the patient has history of dementia. She lives at a nursing facility. She also has history of hyperkalemia, stroke, and hypertension.     HPI/ROS  LIMITATION TO HISTORY   Select: Language Slovak,  Used  and Patient has history of dementia.  OUTSIDE HISTORIAN(S):  Family  at bedside to confirm sequence of events and collateral information provided. See HPI below.     Janine Torrez is a 85 y.o. female who has history of dementia presents to the ED via EMS from [facility] for evaluation after a syncopal episode onset earlier tonight. Nurses at the facility stated that the patient received her carvedilol at 7:30 PM earlier tonight and then  had a witnessed syncopal episode while sitting down. With EMS, the patient's pressure was 82/56. She received 300 mL of NS fluids. The patient's family describes that they received a call informing them that the patient's blood pressure was low. The family reports that the patient been dealing with low blood pressure for quite some time now. The patient's family states that the patient has had syncopal episodes in the past when her blood pressure is low. At this time, the family reports that the patient appears to be more altered than her typical baseline. The patient states she also has abdominal pain, and dizziness, but denies any chest pain. The patient also complains of knee pain, but the family states that this is chronic.     PAST MEDICAL HISTORY  Past Medical History:   Diagnosis Date    Hypertension        SURGICAL HISTORY  Past Surgical History:  "  Procedure Laterality Date    CATARACT PHACO WITH IOL Left 8/13/2018    Procedure: CATARACT PHACO WITH IOL;  Surgeon: Edwardo Saeed M.D.;  Location: SURGERY SAME DAY [facility];  Service: Ophthalmology       FAMILY HISTORY  History reviewed. No pertinent family history.      SOCIAL HISTORY   reports that she has never smoked. She has never used smokeless tobacco. She reports that she does not drink alcohol and does not use drugs.      CURRENT MEDICATIONS  Discharge Medication List as of 2/15/2025  1:57 AM        CONTINUE these medications which have NOT CHANGED    Details   atorvastatin (LIPITOR) 20 MG Tab Take 20 mg by mouth every day., Historical Med      carvedilol (COREG) 3.125 MG Tab Take 3.125 mg by mouth 2 times a day., Historical Med      famotidine (PEPCID) 20 MG Tab Take 20 mg by mouth 2 times a day., Historical Med      losartan (COZAAR) 100 MG Tab Historical Med      amLODIPine (NORVASC) 2.5 MG Tab Take 2.5 mg by mouth every day., Historical Med      hydrOXYzine HCl (ATARAX) 25 MG Tab Take 25 mg by mouth 3 times a day as needed for Itching., Historical Med      ondansetron (ZOFRAN ODT) 4 MG TABLET DISPERSIBLE Take 1 Tablet by mouth every 8 hours as needed for Nausea/Vomiting., Disp-10 Tablet, R-0, Normal      QUEtiapine (SEROQUEL) 25 MG Tab Take 25 mg by mouth every day., Historical Med      hydrocortisone 1 % Cream Apply 1 Application topically 2 times a day., Disp-1.5 g, R-0, Normal      hydrocortisone (ANUSOL-HC) 25 MG Suppos Insert 1 Suppository into the rectum every 12 hours., Disp-12 Suppository, R-0, Normal             ALLERGIES  Patient has no known allergies.      PHYSICAL EXAM  BP 98/64   Pulse 66   Temp 36.7 °C (98 °F) (Temporal)   Resp 18   Ht 1.499 m (4' 11\")   Wt 61.7 kg (136 lb)   SpO2 93%   BMI 27.47 kg/m²     Constitutional: Well developed, Well nourished, No acute distress, Non-toxic appearance.   HENT: Normocephalic, Atraumatic, Bilateral external ears normal, Oropharynx is " clear mucous membranes are moist. No oral exudates or nasal discharge.   Eyes: Pupils are equal round and reactive, EOMI, Conjunctiva normal, No discharge.   Neck: Normal range of motion, No tenderness, Supple, No stridor. No meningismus.  Lymphatic: No lymphadenopathy noted.   Cardiovascular: Regular rate and rhythm without murmur rub or gallop.  Thorax & Lungs: Clear breath sounds bilaterally without wheezes, rhonchi or rales. There is no chest wall tenderness.   Abdomen: Soft, tenderness to the epigastric region, non-distended. There is no rebound or guarding. No organomegaly is appreciated. Bowel sounds are normal.  Skin: Normal without rash.   Extremities: Intact distal pulses, No edema, No tenderness, No cyanosis, No clubbing. Capillary refill is less than 2 seconds.  Musculoskeletal: Good range of motion in all major joints. No tenderness to palpation or major deformities noted.   Neurologic: Alert & oriented to person, but note to time, place or situation. Normal motor function, Normal sensory function, No focal deficits noted. Reflexes are normal.      DIAGNOSTIC STUDIES    EKG/LABS  Labs Reviewed   CBC WITH DIFFERENTIAL - Abnormal; Notable for the following components:       Result Value    RBC 3.52 (*)     Hemoglobin 10.7 (*)     Hematocrit 33.4 (*)     MCHC 32.0 (*)     RDW 50.2 (*)     Platelet Count 160 (*)     MPV 8.8 (*)     Neutrophils-Polys 76.60 (*)     Lymphocytes 15.20 (*)     All other components within normal limits   COMP METABOLIC PANEL - Abnormal; Notable for the following components:    Glucose 118 (*)     Bun 25 (*)     Alkaline Phosphatase 111 (*)     All other components within normal limits   URINALYSIS - Abnormal; Notable for the following components:    Ketones Trace (*)     All other components within normal limits   ESTIMATED GFR - Abnormal; Notable for the following components:    GFR (CKD-EPI) 51 (*)     All other components within normal limits   LACTIC ACID   BLOOD CULTURE    BLOOD CULTURE   LIPASE   TROPONIN   URINE CULTURE(NEW)   POCT COV-2, FLU A/B, RSV BY PCR   POC COV-2, FLU A/B, RSV BY PCR        Results for orders placed or performed during the hospital encounter of 25   EKG   Result Value Ref Range    Report       [facility]    Test Date:  2025  Pt Name:    JUN BRICEÑO       Department: ER  MRN:        [id number]                      Room:        04  Gender:     Female                       Technician: [id number]  :        [id number]                   Requested By:ER TRIAGE PROTOCOL  Order #:    [id number]                    Reading MD:    Measurements  Intervals                                Axis  Rate:       68                           P:          53  FL:         187                          QRS:        -30  QRSD:       56                           T:          58  QT:         477  QTc:        508    Interpretive Statements  Sinus rhythm  Inferior infarct, old  Anterior infarct, old  Prolonged QT interval  Baseline wander in lead(s) II,III,aVF  Compared to ECG 2023 20:25:32  Myocardial infarct finding now present  Prolonged QT interval now present  Left-axis deviation no longer present  T-wave abnormality no longer present          EKG INTERPRETATION    Rhythm: E sinus rhythm  Ventricular rate: 68  Intervals: Borderline prolonged QT interval QTc of approximate 500  Free pura interpretation: No evidence of STEMI    I have independently interpreted this EKG      RADIOLOGY/PROCEDURES   The attending emergency physician has independently interpreted the diagnostic imaging associated with this visit and am waiting the final reading from the radiologist.   My preliminary interpretation is a follows: No acute inflammatory or obstructive etiology in the abdomen.  There are multiple large hepatic cysts.  Patient has hernias, colonic diverticulosis without diverticulitis.  CT head did not show any evidence of acute  intracranial abnormality.  Chest x-ray did not show any signs of focal consolidation or pneumonia    Radiologist interpretation:  CT-ABDOMEN-PELVIS WITH   Final Result      1.  No acute inflammation in the abdomen or pelvis.   2.  Multiple large hepatic cysts. The largest measures 16.6 x 14.2 cm.   3.  There is some hyperdense material at the superior aspect of the large cyst. This could represent solid material or hemorrhage into the cyst. Recommend further evaluation with MRI hepatic protocol to exclude underlying mass.   4.  Right inguinal hernia containing bowel. Left inguinal hernia containing a portion of the sigmoid colon. No evidence of obstruction.   5.  Colonic diverticulosis.   6.  Right renal ectopia, possibly due to mass effect from the large hepatic cysts.   7.  Atherosclerotic changes.      CT-HEAD W/O   Final Result      1.  No acute intracranial abnormality.   2.  Small area of right parietal lobe encephalomalacia again noted.   3.  Senescent changes.               DX-CHEST-PORTABLE (1 VIEW)   Final Result      1.  No acute cardiac or pulmonary abnormalities are identified.          COURSE & MEDICAL DECISION MAKING     ASSESSMENT, COURSE AND PLAN  Care Narrative:     10:03 PM - Patient seen and examined at bedside. This is a 85 year old woman who has history of dementia presents to the emergency department for evaluation a syncopal episode earlier tonight.  Collateral history obtained from family.  Family states that sometimes after getting her blood pressure medications her blood pressure goes low and she passes out.  This happened for the past.  Patient is complaining of some mild nonspecific abdominal pain here, does not appear peritoneal or have a surgical abdomen.  Patient's vital signs are notable for no significant tachycardia but does have some borderline hypotension.  According to family she is at her baseline mental status.  Patient moving all extremities and following commands in German.   Discussed plan of care with the family, including performing an EKG, lab work and imaging. Patient and her family agree to the plan of care. The patient will be resuscitated with 1L LR IV and medicated with Rocephin 2,000 mg. Ordered for EKG, Troponin, POCT CoV-2 Flu A/B and RSV PCR, Lipase, Blood Culture x2, Urine Culture, UA, CMP, CBC w/ Diff., Lactic Acid, DX-Chest, CT-Head w/o, and CT-Abdomen-Pelvis w/ to evaluate her symptoms.     Patient's labs reviewed, did not show any significant leukocytosis, does have a mild anemia.  Electrolytes and metabolic panel largely normal.  Lipase normal, troponin negative, lactic acid normal, viral studies normal.  Urine does not show any evidence of acute infection.    Patient's vital signs improved with fluids here.  CT scan does not show any evidence of acute abnormalities, does have multiple large hepatic cysts, I discussed with the family at bedside.     1:02 AM - The patient was reevaluated at bedside. Updated the patient's family.  After discussion of lab workup, urine, chest x-ray and other imaging findings family okay with taking patient home we had shared decision making, offered inpatient mission but family wishes for patient to go to her care facility and will follow-up with primary care doctor regarding incidental findings on imaging.  Differential diagnose considered.  I doubt acute infectious etiology, sepsis or septic shock at this time.  I suspect likely polypharmacy with some mild dehydration.  Patient was fluid resuscitated here with some improvement in symptoms and is at her baseline mental status.  Patient stable for discharge.  Discussed discharge instructions and return precautions with the patient's family and they were cleared for discharge. Patient's family was given the opportunity to ask any further questions. The family is comfortable with discharge at this time.       Sepsis: Infection was suspected 10:14 PM (Time). Sepsis pathway was initiated.  30cc/kg bolus given. Antibiotics were given per protocol.    ADDITIONAL PROBLEM LIST  Hypotension    DISPOSITION AND DISCUSSIONS  I have discussed management of the patient with the following physicians and SHANA's:  None.    Discussion of management with other QHP or appropriate source(s): None     Escalation of care considered, and ultimately not performed: after discussion with the patient / family, they have elected to decline an escalation in care.    Barriers to care at this time, including but not limited to:  None known .     The patient will return for new or worsening symptoms and is stable at the time of discharge.    DISPOSITION:  Patient will be discharged home in stable condition.    FOLLOW UP:  Braden Jennings M.D.  [address] [place] [id number] [phone]    Schedule an appointment as soon as possible for a visit       FINAL DIAGNOSIS  1. Syncope, unspecified syncope type Acute   2. Transient hypotension Acute   3. Liver cyst Active      Rose DOBBINS (Scribe), am scribing for, and in the presence of, Rivas Dobbins M.D..    Electronically signed by: Rose Chappell (Bryanibsalvador), 2/14/2025    IRivas M.D. personally performed the services described in this documentation, as scribed by Rose Chappell in my presence, and it is both accurate and complete.      The note accurately reflects work and decisions made by me.  Rivas Dobbins M.D.  2/15/2025  7:05 AM

## 2025-02-15 NOTE — DISCHARGE PLANNING
This nurse was asked to arrange safe transportation back to [facility].  CM completed a PCS for [facility] and completed the transfer packet.  [facility] set up for 2:15 am.  Packet provided to bedside RN.

## 2025-02-16 LAB
BACTERIA BLD CULT: ABNORMAL
BACTERIA BLD CULT: ABNORMAL
SIGNIFICANT IND 70042: ABNORMAL
SITE SITE: ABNORMAL
SOURCE SOURCE: ABNORMAL

## 2025-02-16 NOTE — ED NOTES
ED Positive Culture Follow-up/Notification Note:    Date: 2/16/2025     Patient from [facility] was seen in the ED on 2/14/2025 for syncope. Facility nurses reported patient had a syncopal episode while sitting down after receiving carvedilol. Patient's family reported patient had issues with low blood pressure and has had syncopal episodes with hypotension in the past. Family also noted patient was more altered than her typical baseline. Patient reported abdominal pain, dizziness, and chronic knee pain. She denied chest pain. Physical exam was significant for epigastric tenderness. Imaging workup were negative for acute infectious processes.    1. Syncope, unspecified syncope type Acute   2. Transient hypotension Acute   3. Liver cyst Active      In the ED, patient received ceftriaxone 2 g IV x 1 dose     Discharge Medication List as of 2/15/2025  1:57 AM        Allergies: Patient has no known allergies.     Vitals:    02/14/25 2200 02/14/25 2300 02/15/25 0000 02/15/25 0100   BP: 96/51 110/74 123/68 121/71   Pulse: 66 64 69 60   Resp: 16 15 16 16   Temp:    36.1 °C (97 °F)   TempSrc:    Temporal   SpO2: 92% 93% 96% 98%   Weight:       Height:         Final cultures:   Results       Procedure Component Value Units Date/Time    Blood Culture - Draw one from central line and one from peripheral site [[id number]]  (Abnormal) Collected: 02/14/25 2310    Order Status: Completed Specimen: Blood from Peripheral Updated: 02/15/25 1952     Significant Indicator POS     Source BLD     Site PERIPHERAL     Culture Result Growth detected by automated blood culture system.  02/15/2025  17:46  Gram Stain: Gram positive cocci in pairs/chains.        Streptococcus species  Identification performed by [facility], unable to  differentiate species. Confirmatory testing and  susceptibilities to follow if indicated.      Blood Culture - Draw one from central line and one from peripheral site [[id number]] Collected: 02/14/25 8661     Order Status: Completed Specimen: Blood from Line Updated: 02/15/25 0208     Significant Indicator NEG     Source BLD     Site Peripheral     Culture Result No Growth  Note: Blood cultures are incubated for 5 days and  are monitored continuously.Positive blood cultures  are called to the RN and reported as soon as  they are identified.      Urinalysis [[id number]]  (Abnormal) Collected: 02/14/25 2359    Order Status: Completed Specimen: Urine Updated: 02/15/25 0013     Color Dark Yellow     Character Clear     Specific Gravity 1.030     Ph 5.5     Glucose Negative mg/dL      Ketones Trace mg/dL      Protein Negative mg/dL      Bilirubin Negative     Urobilinogen, Urine 1.0 EU/dL      Nitrite Negative     Leukocyte Esterase Negative     Occult Blood Negative     Micro Urine Req see below     Comment: Microscopic examination not performed when specimen is clear  and chemically negative for protein, blood, leukocyte esterase  and nitrite.         Urine Culture (New) [[id number]] Collected: 02/14/25 2359    Order Status: Sent Specimen: Urine Updated: 02/15/25 0003            Plan:   Preliminary blood culture results show 1 out of 2 sets positive for Streptococcus species. Unclear if this a contaminant vs. true infection. This could be a contaminant given patient's presentation was likely due to polypharmacy, she was afebrile with normal WBC and ED work up was negative for acute infection. Patient was discharged to [facility]. Called [facility] and spoke to Wade to obtain their fax number. Will fax her blood culture results to the [facility] (Fax # [phone]).     Kenisha Reynolds, MaganD

## 2025-02-17 LAB
BACTERIA UR CULT: NORMAL
SIGNIFICANT IND 70042: NORMAL
SITE SITE: NORMAL
SOURCE SOURCE: NORMAL

## 2025-02-20 LAB
BACTERIA BLD CULT: NORMAL
SIGNIFICANT IND 70042: NORMAL
SITE SITE: NORMAL
SOURCE SOURCE: NORMAL

## 2025-06-18 ENCOUNTER — HOSPITAL ENCOUNTER (OUTPATIENT)
Facility: MEDICAL CENTER | Age: 85
End: 2025-06-18
Payer: COMMERCIAL

## 2025-06-18 LAB
ALBUMIN SERPL BCP-MCNC: 3.8 G/DL (ref 3.2–4.9)
ALBUMIN/GLOB SERPL: 1.2 G/DL
ALP SERPL-CCNC: 110 U/L (ref 30–99)
ALT SERPL-CCNC: 14 U/L (ref 2–50)
ANION GAP SERPL CALC-SCNC: 13 MMOL/L (ref 7–16)
AST SERPL-CCNC: 33 U/L (ref 12–45)
BASOPHILS # BLD AUTO: 0.4 % (ref 0–1.8)
BASOPHILS # BLD: 0.04 K/UL (ref 0–0.12)
BILIRUB SERPL-MCNC: 0.6 MG/DL (ref 0.1–1.5)
BUN SERPL-MCNC: 43 MG/DL (ref 8–22)
CALCIUM ALBUM COR SERPL-MCNC: 10.4 MG/DL (ref 8.5–10.5)
CALCIUM SERPL-MCNC: 10.2 MG/DL (ref 8.5–10.5)
CHLORIDE SERPL-SCNC: 109 MMOL/L (ref 96–112)
CO2 SERPL-SCNC: 21 MMOL/L (ref 20–33)
CREAT SERPL-MCNC: 1.61 MG/DL (ref 0.5–1.4)
EOSINOPHIL # BLD AUTO: 0.14 K/UL (ref 0–0.51)
EOSINOPHIL NFR BLD: 1.5 % (ref 0–6.9)
ERYTHROCYTE [DISTWIDTH] IN BLOOD BY AUTOMATED COUNT: 50.3 FL (ref 35.9–50)
GFR SERPLBLD CREATININE-BSD FMLA CKD-EPI: 31 ML/MIN/1.73 M 2
GLOBULIN SER CALC-MCNC: 3.1 G/DL (ref 1.9–3.5)
GLUCOSE SERPL-MCNC: 118 MG/DL (ref 65–99)
HCT VFR BLD AUTO: 44.3 % (ref 37–47)
HGB BLD-MCNC: 13.8 G/DL (ref 12–16)
IMM GRANULOCYTES # BLD AUTO: 0.02 K/UL (ref 0–0.11)
IMM GRANULOCYTES NFR BLD AUTO: 0.2 % (ref 0–0.9)
LYMPHOCYTES # BLD AUTO: 1.6 K/UL (ref 1–4.8)
LYMPHOCYTES NFR BLD: 17.7 % (ref 22–41)
MCH RBC QN AUTO: 30.5 PG (ref 27–33)
MCHC RBC AUTO-ENTMCNC: 31.2 G/DL (ref 32.2–35.5)
MCV RBC AUTO: 97.8 FL (ref 81.4–97.8)
MONOCYTES # BLD AUTO: 0.45 K/UL (ref 0–0.85)
MONOCYTES NFR BLD AUTO: 5 % (ref 0–13.4)
NEUTROPHILS # BLD AUTO: 6.79 K/UL (ref 1.82–7.42)
NEUTROPHILS NFR BLD: 75.2 % (ref 44–72)
NRBC # BLD AUTO: 0 K/UL
NRBC BLD-RTO: 0 /100 WBC (ref 0–0.2)
PLATELET # BLD AUTO: 232 K/UL (ref 164–446)
PMV BLD AUTO: 10 FL (ref 9–12.9)
POTASSIUM SERPL-SCNC: 4.9 MMOL/L (ref 3.6–5.5)
PROT SERPL-MCNC: 6.9 G/DL (ref 6–8.2)
RBC # BLD AUTO: 4.53 M/UL (ref 4.2–5.4)
SODIUM SERPL-SCNC: 143 MMOL/L (ref 135–145)
WBC # BLD AUTO: 9 K/UL (ref 4.8–10.8)

## 2025-08-04 ENCOUNTER — HOSPITAL ENCOUNTER (OUTPATIENT)
Dept: RADIOLOGY | Facility: MEDICAL CENTER | Age: 85
End: 2025-08-04
Payer: MEDICARE

## 2025-08-04 DIAGNOSIS — R22.1 NECK MASS: ICD-10-CM

## 2025-08-04 PROCEDURE — 70490 CT SOFT TISSUE NECK W/O DYE: CPT

## (undated) DEVICE — CANNULA INJECTION 27G (EYE) - 10/BX ALCON

## (undated) DEVICE — KIT ANESTHESIA W/CIRCUIT & 3/LT BAG W/FILTER (20EA/CA)

## (undated) DEVICE — KNIFE SLIT DUAL BEVEL ANGLED - (6/BX)

## (undated) DEVICE — GLOVE BIOGEL SZ 8.5 SURGICAL PF LTX - (50PR/BX 4BX/CA)

## (undated) DEVICE — NEEDLE CYSTOTOME OPTH VSTC  0.4MM X 16MM - (10/CA)

## (undated) DEVICE — SUTURE EYE

## (undated) DEVICE — TIP POLYMER I&A

## (undated) DEVICE — NEEDLE FILTER ASPIRATION 18 GA X 1 1/2 IN (100EA/BX)

## (undated) DEVICE — SET LEADWIRE 5 LEAD BEDSIDE DISPOSABLE ECG (1SET OF 5/EA)

## (undated) DEVICE — SENSOR SPO2 NEO LNCS ADHESIVE (20/BX) SEE USER NOTES

## (undated) DEVICE — LACTATED RINGERS INJ 1000 ML - (14EA/CA 60CA/PF)

## (undated) DEVICE — CON SEDATION EA ADDL 15 MIN

## (undated) DEVICE — CANISTER SUCTION RIGID RED 1500CC (40EA/CA)

## (undated) DEVICE — CATHETER IV 20 GA X 1-1/4 ---SURG.& SDS ONLY--- (50EA/BX)

## (undated) DEVICE — CON SEDATION/>5 YR 1ST 15 MIN

## (undated) DEVICE — SUCTION INSTRUMENT YANKAUER BULBOUS TIP W/O VENT (50EA/CA)

## (undated) DEVICE — CARTRIDGE MONARCH C - (10EA/BX)

## (undated) DEVICE — GLOVE BIOGEL SZ 7.5 SURGICAL PF LTX - (50PR/BX 4BX/CA)

## (undated) DEVICE — PACK BASIC CATARACT - (4/BX)

## (undated) DEVICE — CANISTER SUCTION 3000ML MECHANICAL FILTER AUTO SHUTOFF MEDI-VAC NONSTERILE LF DISP  (40EA/CA)

## (undated) DEVICE — WATER IRRIGATION STERILE 1000ML (12EA/CA)

## (undated) DEVICE — KIT  I.V. START (100EA/CA)

## (undated) DEVICE — TIP 45 DEG KELMAN 0.9MM - (6/BX)

## (undated) DEVICE — SODIUM CHL IRRIGATION 0.9% 1000ML (12EA/CA)

## (undated) DEVICE — ELECTRODE 850 FOAM ADHESIVE - HYDROGEL RADIOTRNSPRNT (50/PK)

## (undated) DEVICE — KNIFE MICROSURGICAL 15 DEGREE GREEN

## (undated) DEVICE — TUBE CONNECTING SUCTION - CLEAR PLASTIC STERILE 72 IN (50EA/CA)

## (undated) DEVICE — TUBING CLEARLINK DUO-VENT - C-FLO (48EA/CA)